# Patient Record
Sex: FEMALE | Race: WHITE | NOT HISPANIC OR LATINO | Employment: OTHER | URBAN - NONMETROPOLITAN AREA
[De-identification: names, ages, dates, MRNs, and addresses within clinical notes are randomized per-mention and may not be internally consistent; named-entity substitution may affect disease eponyms.]

---

## 2017-03-01 ENCOUNTER — OFFICE VISIT - GICH (OUTPATIENT)
Dept: FAMILY MEDICINE | Facility: OTHER | Age: 72
End: 2017-03-01

## 2017-03-01 ENCOUNTER — HISTORY (OUTPATIENT)
Dept: FAMILY MEDICINE | Facility: OTHER | Age: 72
End: 2017-03-01

## 2017-03-01 DIAGNOSIS — R79.89 OTHER SPECIFIED ABNORMAL FINDINGS OF BLOOD CHEMISTRY: ICD-10-CM

## 2017-03-01 DIAGNOSIS — N28.9 DISORDER OF KIDNEY AND URETER: ICD-10-CM

## 2017-03-01 DIAGNOSIS — R39.11 HESITANCY OF MICTURITION: ICD-10-CM

## 2017-03-01 DIAGNOSIS — R68.89 OTHER GENERAL SYMPTOMS AND SIGNS: ICD-10-CM

## 2017-03-01 LAB
ABSOLUTE BASOPHILS - HISTORICAL: 0.1 THOU/CU MM
ABSOLUTE EOSINOPHILS - HISTORICAL: 0.1 THOU/CU MM
ABSOLUTE LYMPHOCYTES - HISTORICAL: 1.5 THOU/CU MM (ref 0.9–2.9)
ABSOLUTE MONOCYTES - HISTORICAL: 0.7 THOU/CU MM
ABSOLUTE NEUTROPHILS - HISTORICAL: 7.1 THOU/CU MM (ref 1.7–7)
ANION GAP - HISTORICAL: 13 (ref 5–18)
BACTERIA URINE: NORMAL BACTERIA/HPF
BASOPHILS # BLD AUTO: 1 %
BILIRUB UR QL: NEGATIVE
BUN SERPL-MCNC: 36 MG/DL (ref 7–25)
BUN/CREAT RATIO - HISTORICAL: 24
CALCIUM SERPL-MCNC: 9.9 MG/DL (ref 8.6–10.3)
CHLORIDE SERPLBLD-SCNC: 100 MMOL/L (ref 98–107)
CLARITY, URINE: CLEAR CLARITY
CO2 SERPL-SCNC: 28 MMOL/L (ref 21–31)
COLOR UR: YELLOW COLOR
CREAT SERPL-MCNC: 1.52 MG/DL (ref 0.7–1.3)
EOSINOPHIL NFR BLD AUTO: 1.5 %
EPITHELIAL CELLS: NORMAL EPI/HPF
ERYTHROCYTE [DISTWIDTH] IN BLOOD BY AUTOMATED COUNT: 14.8 % (ref 11.5–15.5)
GFR IF NOT AFRICAN AMERICAN - HISTORICAL: 34 ML/MIN/1.73M2
GLUCOSE SERPL-MCNC: 142 MG/DL (ref 70–105)
GLUCOSE URINE: NEGATIVE MG/DL
HCT VFR BLD AUTO: 42.1 % (ref 33–51)
HEMOGLOBIN: 13.1 G/DL (ref 12–16)
KETONES UR QL: NEGATIVE MG/DL
LEUKOCYTE ESTERASE URINE: NEGATIVE
LYMPHOCYTES NFR BLD AUTO: 15.5 % (ref 20–44)
MCH RBC QN AUTO: 27.7 PG (ref 26–34)
MCHC RBC AUTO-ENTMCNC: 31.1 G/DL (ref 32–36)
MCV RBC AUTO: 89 FL (ref 80–100)
MONOCYTES NFR BLD AUTO: 7.3 %
NEUTROPHILS NFR BLD AUTO: 74.6 % (ref 42–72)
NITRITE UR QL STRIP: NEGATIVE
OCCULT BLOOD,URINE - HISTORICAL: ABNORMAL
PH UR: 6.5 [PH]
PLATELET # BLD AUTO: 299 THOU/CU MM (ref 140–440)
PMV BLD: 6.6 FL (ref 6.5–11)
POTASSIUM SERPL-SCNC: 4.2 MMOL/L (ref 3.5–5.1)
PROTEIN QUALITATIVE,URINE - HISTORICAL: 100 MG/DL
RBC - HISTORICAL: NORMAL /HPF
RED BLOOD COUNT - HISTORICAL: 4.73 MIL/CU MM (ref 4–5.2)
SODIUM SERPL-SCNC: 141 MMOL/L (ref 133–143)
SP GR UR STRIP: 1.02
UROBILINOGEN,QUALITATIVE - HISTORICAL: NORMAL EU/DL
WBC - HISTORICAL: NORMAL /HPF
WHITE BLOOD COUNT - HISTORICAL: 9.5 THOU/CU MM (ref 4.5–11)

## 2017-03-13 ENCOUNTER — TRANSFERRED RECORDS (OUTPATIENT)
Dept: HEALTH INFORMATION MANAGEMENT | Facility: CLINIC | Age: 72
End: 2017-03-13

## 2017-03-13 ENCOUNTER — AMBULATORY - GICH (OUTPATIENT)
Dept: FAMILY MEDICINE | Facility: OTHER | Age: 72
End: 2017-03-13

## 2017-03-13 ENCOUNTER — MEDICAL CORRESPONDENCE (OUTPATIENT)
Facility: CLINIC | Age: 72
End: 2017-03-13
Payer: COMMERCIAL

## 2017-03-13 ENCOUNTER — COMMUNICATION - GICH (OUTPATIENT)
Dept: FAMILY MEDICINE | Facility: OTHER | Age: 72
End: 2017-03-13

## 2017-03-13 ENCOUNTER — HOSPITAL ENCOUNTER (OUTPATIENT)
Dept: RADIOLOGY | Facility: OTHER | Age: 72
End: 2017-03-13
Attending: PHYSICIAN ASSISTANT

## 2017-03-13 DIAGNOSIS — R79.89 OTHER SPECIFIED ABNORMAL FINDINGS OF BLOOD CHEMISTRY: ICD-10-CM

## 2017-03-13 DIAGNOSIS — N28.9 DISORDER OF KIDNEY AND URETER: ICD-10-CM

## 2017-03-13 DIAGNOSIS — R06.02 SHORTNESS OF BREATH: ICD-10-CM

## 2017-03-13 DIAGNOSIS — R68.89 OTHER GENERAL SYMPTOMS AND SIGNS: ICD-10-CM

## 2017-03-13 PROCEDURE — 93306 TTE W/DOPPLER COMPLETE: CPT | Mod: 26 | Performed by: INTERNAL MEDICINE

## 2017-03-15 ENCOUNTER — COMMUNICATION - GICH (OUTPATIENT)
Dept: FAMILY MEDICINE | Facility: OTHER | Age: 72
End: 2017-03-15

## 2017-03-15 DIAGNOSIS — R79.89 OTHER SPECIFIED ABNORMAL FINDINGS OF BLOOD CHEMISTRY: ICD-10-CM

## 2017-03-15 DIAGNOSIS — R93.429 ABNORMAL FINDING ON DIAGNOSTIC IMAGING OF KIDNEY: ICD-10-CM

## 2017-03-15 DIAGNOSIS — I27.29 OTHER SECONDARY PULMONARY HYPERTENSION (H): ICD-10-CM

## 2017-03-21 ENCOUNTER — HISTORY (OUTPATIENT)
Dept: PEDIATRICS | Facility: OTHER | Age: 72
End: 2017-03-21

## 2017-03-21 ENCOUNTER — OFFICE VISIT - GICH (OUTPATIENT)
Dept: PEDIATRICS | Facility: OTHER | Age: 72
End: 2017-03-21

## 2017-03-21 DIAGNOSIS — N18.30 CHRONIC KIDNEY DISEASE, STAGE III (MODERATE) (H): ICD-10-CM

## 2017-03-21 DIAGNOSIS — R93.429 ABNORMAL FINDING ON DIAGNOSTIC IMAGING OF KIDNEY: ICD-10-CM

## 2017-03-21 DIAGNOSIS — E66.01 MORBID (SEVERE) OBESITY DUE TO EXCESS CALORIES (H): ICD-10-CM

## 2017-03-21 DIAGNOSIS — R79.89 OTHER SPECIFIED ABNORMAL FINDINGS OF BLOOD CHEMISTRY: ICD-10-CM

## 2017-03-21 DIAGNOSIS — E11.9 TYPE 2 DIABETES MELLITUS WITHOUT COMPLICATIONS (H): ICD-10-CM

## 2017-03-21 DIAGNOSIS — I27.29 OTHER SECONDARY PULMONARY HYPERTENSION (H): ICD-10-CM

## 2017-04-11 ENCOUNTER — COMMUNICATION - GICH (OUTPATIENT)
Dept: FAMILY MEDICINE | Facility: OTHER | Age: 72
End: 2017-04-11

## 2017-04-11 DIAGNOSIS — E78.5 HYPERLIPIDEMIA: ICD-10-CM

## 2017-04-11 DIAGNOSIS — E11.65 TYPE 2 DIABETES MELLITUS WITH HYPERGLYCEMIA (H): ICD-10-CM

## 2017-04-11 DIAGNOSIS — I10 ESSENTIAL (PRIMARY) HYPERTENSION: ICD-10-CM

## 2017-05-08 ENCOUNTER — HOSPITAL ENCOUNTER (OUTPATIENT)
Dept: RADIOLOGY | Facility: OTHER | Age: 72
End: 2017-05-08

## 2017-05-08 ENCOUNTER — HISTORY (OUTPATIENT)
Dept: FAMILY MEDICINE | Facility: OTHER | Age: 72
End: 2017-05-08

## 2017-05-08 ENCOUNTER — HISTORY (OUTPATIENT)
Dept: RADIOLOGY | Facility: OTHER | Age: 72
End: 2017-05-08

## 2017-05-08 ENCOUNTER — OFFICE VISIT - GICH (OUTPATIENT)
Dept: FAMILY MEDICINE | Facility: OTHER | Age: 72
End: 2017-05-08

## 2017-05-08 DIAGNOSIS — Z12.31 ENCOUNTER FOR SCREENING MAMMOGRAM FOR MALIGNANT NEOPLASM OF BREAST: ICD-10-CM

## 2017-05-08 DIAGNOSIS — N28.9 DISORDER OF KIDNEY AND URETER: ICD-10-CM

## 2017-05-08 DIAGNOSIS — R79.9 ABNORMAL FINDING OF BLOOD CHEMISTRY: ICD-10-CM

## 2017-05-08 DIAGNOSIS — J45.40 MODERATE PERSISTENT ASTHMA, UNCOMPLICATED: ICD-10-CM

## 2017-05-08 DIAGNOSIS — I10 ESSENTIAL (PRIMARY) HYPERTENSION: ICD-10-CM

## 2017-05-08 DIAGNOSIS — I27.29 OTHER SECONDARY PULMONARY HYPERTENSION (H): ICD-10-CM

## 2017-05-08 DIAGNOSIS — E11.65 TYPE 2 DIABETES MELLITUS WITH HYPERGLYCEMIA (H): ICD-10-CM

## 2017-05-08 DIAGNOSIS — K43.2 INCISIONAL HERNIA, WITHOUT OBSTRUCTION OR GANGRENE: ICD-10-CM

## 2017-05-08 DIAGNOSIS — E78.5 HYPERLIPIDEMIA: ICD-10-CM

## 2017-05-08 LAB
A/G RATIO - HISTORICAL: 1.3 (ref 1–2)
ALB RAND URINE - HISTORICAL: 1356.1 MG/L
ALBUMIN SERPL-MCNC: 3.9 G/DL (ref 3.5–5.7)
ALP SERPL-CCNC: 66 IU/L (ref 34–104)
ALT (SGPT) - HISTORICAL: 17 IU/L (ref 7–52)
ANION GAP - HISTORICAL: 11 (ref 5–18)
AST SERPL-CCNC: 16 IU/L (ref 13–39)
BACTERIA URINE: NORMAL BACTERIA/HPF
BILIRUB SERPL-MCNC: 0.5 MG/DL (ref 0.3–1)
BILIRUB UR QL: NEGATIVE
BUN SERPL-MCNC: 22 MG/DL (ref 7–25)
BUN/CREAT RATIO - HISTORICAL: 17
CALCIUM SERPL-MCNC: 9.6 MG/DL (ref 8.6–10.3)
CHLORIDE SERPLBLD-SCNC: 103 MMOL/L (ref 98–107)
CHOL/HDL RATIO - HISTORICAL: 2.38
CHOLESTEROL TOTAL: 119 MG/DL
CLARITY, URINE: CLEAR CLARITY
CO2 SERPL-SCNC: 24 MMOL/L (ref 21–31)
COLOR UR: YELLOW COLOR
CREAT SERPL-MCNC: 1.29 MG/DL (ref 0.7–1.3)
CREATININE, URINE - HISTORICAL: 0.62 G/L
EPITHELIAL CELLS: NORMAL EPI/HPF
ESTIMATED AVERAGE GLUCOSE: 140 MG/DL
GFR IF NOT AFRICAN AMERICAN - HISTORICAL: 41 ML/MIN/1.73M2
GLOBULIN - HISTORICAL: 3.1 G/DL (ref 2–3.7)
GLUCOSE SERPL-MCNC: 131 MG/DL (ref 70–105)
GLUCOSE URINE: NEGATIVE MG/DL
HDLC SERPL-MCNC: 50 MG/DL (ref 23–92)
HEMOGLOBIN A1C MONITORING (POCT) - HISTORICAL: 6.5 % (ref 4–6.2)
HEMOGLOBIN: 12.3 G/DL (ref 12–16)
KETONES UR QL: NEGATIVE MG/DL
LDLC SERPL CALC-MCNC: 46 MG/DL
LEUKOCYTE ESTERASE URINE: NEGATIVE
MCV RBC AUTO: 90 FL (ref 80–100)
MICROALBUMIN, RAND UR - HISTORICAL: 2187.3 MG/G CREAT
NITRITE UR QL STRIP: NEGATIVE
NON-HDL CHOLESTEROL - HISTORICAL: 69 MG/DL
OCCULT BLOOD,URINE - HISTORICAL: ABNORMAL
PATIENT STATUS - HISTORICAL: NORMAL
PH UR: 6 [PH]
POTASSIUM SERPL-SCNC: 4.7 MMOL/L (ref 3.5–5.1)
PROT SERPL-MCNC: 7 G/DL (ref 6.4–8.9)
PROTEIN QUALITATIVE,URINE - HISTORICAL: 100 MG/DL
RBC - HISTORICAL: NORMAL /HPF
SODIUM SERPL-SCNC: 138 MMOL/L (ref 133–143)
SP GR UR STRIP: 1.02
TRIGL SERPL-MCNC: 117 MG/DL
URATE SERPL-MCNC: 5.3 MG/DL (ref 4.4–7.6)
UROBILINOGEN,QUALITATIVE - HISTORICAL: NORMAL EU/DL
WBC - HISTORICAL: NORMAL /HPF

## 2017-06-06 ENCOUNTER — COMMUNICATION - GICH (OUTPATIENT)
Dept: FAMILY MEDICINE | Facility: OTHER | Age: 72
End: 2017-06-06

## 2017-06-06 ENCOUNTER — AMBULATORY - GICH (OUTPATIENT)
Dept: RADIOLOGY | Facility: OTHER | Age: 72
End: 2017-06-06

## 2017-06-06 DIAGNOSIS — R00.1 BRADYCARDIA: ICD-10-CM

## 2017-06-06 DIAGNOSIS — I27.29 OTHER SECONDARY PULMONARY HYPERTENSION (H): ICD-10-CM

## 2017-06-07 ENCOUNTER — AMBULATORY - GICH (OUTPATIENT)
Dept: SCHEDULING | Facility: OTHER | Age: 72
End: 2017-06-07

## 2017-06-15 ENCOUNTER — HISTORY (OUTPATIENT)
Dept: FAMILY MEDICINE | Facility: OTHER | Age: 72
End: 2017-06-15

## 2017-06-15 ENCOUNTER — OFFICE VISIT - GICH (OUTPATIENT)
Dept: FAMILY MEDICINE | Facility: OTHER | Age: 72
End: 2017-06-15

## 2017-06-15 DIAGNOSIS — I44.7 LEFT BUNDLE-BRANCH BLOCK: ICD-10-CM

## 2017-06-15 DIAGNOSIS — R00.1 BRADYCARDIA: ICD-10-CM

## 2017-06-15 DIAGNOSIS — I10 ESSENTIAL (PRIMARY) HYPERTENSION: ICD-10-CM

## 2017-06-16 ENCOUNTER — AMBULATORY - GICH (OUTPATIENT)
Dept: SCHEDULING | Facility: OTHER | Age: 72
End: 2017-06-16

## 2017-06-20 ENCOUNTER — AMBULATORY - GICH (OUTPATIENT)
Dept: SCHEDULING | Facility: OTHER | Age: 72
End: 2017-06-20

## 2017-08-01 ENCOUNTER — COMMUNICATION - GICH (OUTPATIENT)
Dept: FAMILY MEDICINE | Facility: OTHER | Age: 72
End: 2017-08-01

## 2017-08-01 DIAGNOSIS — R79.9 ABNORMAL FINDING OF BLOOD CHEMISTRY: ICD-10-CM

## 2017-08-01 DIAGNOSIS — I10 ESSENTIAL (PRIMARY) HYPERTENSION: ICD-10-CM

## 2017-09-20 ENCOUNTER — COMMUNICATION - GICH (OUTPATIENT)
Dept: FAMILY MEDICINE | Facility: OTHER | Age: 72
End: 2017-09-20

## 2017-09-20 DIAGNOSIS — E11.65 TYPE 2 DIABETES MELLITUS WITH HYPERGLYCEMIA (H): ICD-10-CM

## 2017-12-28 NOTE — TELEPHONE ENCOUNTER
Patient Information     Patient Name MRN Sex Milena Guerin [id number] Female 1945      Telephone Encounter by Madeline Ruano RN at 2017  8:29 AM     Author:  Madeline Ruano RN Service:  (none) Author Type:  NURS- Registered Nurse     Filed:  2017  8:46 AM Encounter Date:  2017 Status:  Signed     :  Madeline Ruano RN (NURS- Registered Nurse)            Contacted patient who verified that she is taking Metformin 1,000 mg one tablet daily. Please note updated sig to reflect what patient is taking   This is a Refill request from: [facility]  Name of Medication:metFORMIN (GLUCOPHAGE) 1,000 mg tablet  Quantity requested: 90 x 3   Last fill date:   Due for refill: yes   Last visit with ALEXEY HAWKINS was on: 2017- refer to  17 letter recommending to to take half of the old dose of Metformin. Also recommended to repeat labs at the end of summer. Reminder letter sent.   Diagnosis r/t this medication request: DM       HEMOGLOBIN A1C MONITORING (POCT)    Date Value   2017 6.5 % (H)   2013 9.0 % NGSP (H)

## 2017-12-28 NOTE — TELEPHONE ENCOUNTER
Patient Information     Patient Name MRN Sex Milena Guerin [id number] Female 1945      Telephone Encounter by Madeline Ruano RN at 2017 11:48 AM     Author:  Madeline Ruano RN Service:  (none) Author Type:  NURS- Registered Nurse     Filed:  2017 12:07 PM Encounter Date:  2017 Status:  Signed     :  Madeline Ruano RN (NURS- Registered Nurse)            These medications are listed as historical and without sig on snapshot. Please note medications arthur'd up as requested by pharmacy. Please review, sign and send if agree.     This is a Refill request from: [facility]  Name of Medication: carvedilol (COREG) 6.25 mg tablet-take 1 tab orally twice daily with food   Quantity requested: 180 x 3   Last fill date: 17 per pharmacy   Diagnosis r/t this medication request: unsure     Name of Medication: allopurinol (ZYLOPRIM) 100 mg tablet-take 1 tab orally once daily  Quantity requested: 90 x 3   Last fill date: 17 per pharmacy  Diagnosis r/t this medication request: Hyperuricemia    Last visit with ALEXEY HAWKINS was on: 2017 in [facility]       Unable to complete prescription refill per RN Medication Refill Policy.................... Madeline Ruano RN ....................  2017   11:59 AM

## 2017-12-28 NOTE — PROGRESS NOTES
Patient Information     Patient Name MRN Sex Milena Guerin [id number] Female 1945      Progress Notes by Ezequiel Aponte MD at 6/15/2017  2:00 PM     Author:  Ezequiel Aponte MD Service:  (none) Author Type:  Physician     Filed:  6/15/2017  2:28 PM Encounter Date:  6/15/2017 Status:  Signed     :  Ezequiel Aponte MD (Physician)            SUBJECTIVE:  Milena Contreras is a 71 y.o. female here for ER follow-up. Patient was recently seen in the [facility] emergency room that she's having shortness of breath. She was found to have some to Medicare card. She was preceded on atenolol 100 mg twice daily and that was discontinued. She was switched to carvedilol 6.25 mg twice daily. She reports that her swelling, shortness of breath and exertional activity has improved significantly. Overall she is quite happy with her improvement. She has been tolerating carvedilol well.    While in the emergency room she was found to have left bundle branch block, she is unsure if this is new.    She has a history of recent echocardiogram in March which showed normal ejection fraction and mild pulmonary hypertension.      Patient Active Problem List       Diagnosis  Date Noted     LBBB (left bundle branch block)  06/15/2017     Mild pulmonary hypertension (HC)  2017     Bilateral primary osteoarthritis of knee  2016     ABDOMINAL INCISIONAL HERNIA  2012     RENAL INSUFFICIENCY  2010     CKD-3          DIABETES MELLITUS, TYPE II, UNCONTROLLED  2010     HYPERTENSION       ASTHMA, PERSISTENT, MODERATE       cold, deer fur and cat triggered, spirometry done 05          PSORIASIS       OBESITY       HYPERLIPIDEMIA       HYPERURICEMIA         Past Medical History:     Diagnosis  Date     Alopecia      Chronic kidney disease     Cr 1.3; +proteinuria      DM2 (diabetes mellitus, type 2) (HC)     DM2 - referred to [facility]       Hyperlipidemia      Hyperuricemia     With gout x3       Obesity      Persistent asthma        Past Surgical History:      Procedure  Laterality Date     COLONOSCOPY SCREENING  01/2004    a few external hemorrhoids       COLONOSCOPY SCREENING  4/3/2014    diverticulosis fu 10 yrs       FRACTURE TREATMENT      Left finger ORIF for fracture       JIMMY AND BSO      JIMMY/BSO for uterine fibroids          Current Outpatient Prescriptions       Medication  Sig Dispense Refill     albuterol HFA (VENTOLIN HFA) 90 mcg/actuation inhaler Inhale 2 Puffs by mouth every 4 hours if needed for Shortness Of Breath or Wheezing. 1 Inhaler 11     allopurinol (ZYLOPRIM) 100 mg tablet        amLODIPine (NORVASC) 10 mg tablet Take 1 tablet by mouth once daily. 90 tablet 3     aspirin chewable 81 mg chewable tablet Take 81 mg by mouth once daily with a meal.       blood sugar diagnostic (ASCENSIA CONTOUR) strip Testing twice daily E11.65 100 Strip 10     CALCIUM CARBONATE/VITAMIN D3 (CALCIUM WITH VITAMIN D ORAL) Take 1 Tab by mouth 2 times daily.       carvedilol (COREG) 6.25 mg tablet        fluticasone-salmeterol (ADVAIR DISKUS) 250-50 mcg/Dose diskus inhaler Inhale 1 Puff by mouth 2 times daily. 3 Inhaler 3     lisinopril-hydrochlorothiazide, 20-25 mg, (PRINZIDE, ZESTORETIC) 20-25 mg per tablet Take 1 tablet by mouth once daily. 90 tablet 3     metFORMIN (GLUCOPHAGE) 1,000 mg tablet        omega-3 fatty acids-vitamin E (FISH OIL) 1,000 mg cap Take 1 capsule by mouth once daily.       simvastatin (ZOCOR) 20 mg tablet Take 1 tablet by mouth at bedtime. 90 tablet 3     No current facility-administered medications for this visit.      Medications have been reviewed by me and are current to the best of my knowledge and ability.      Allergies:  Allergies      Allergen   Reactions     Cats (Fur, Dander, Saliva)  Rash     And BEER        Family History       Problem   Relation Age of Onset     Cancer  Mother      Lung        Hypertension  Mother      Cancer-breast  Mother      Age 80       Stroke   Mother       of CVA age 79       Heart Disease  Father       of MI age 72       Asthma  Daughter      Hypertension  Sister      Hypertension       Osteoarthritis  Sister      Osteoarthritis  Brother      Kidney disease  No Family History      Blood Disease  No Family History      no blood clots         Social History     Substance Use Topics       Smoking status: Never Smoker     Smokeless tobacco: Never Used     Alcohol use No       ROS:    As above otherwise ROS is unremarkable.      OBJECTIVE:  /82  Pulse 68  Wt 106.6 kg (235 lb)  BMI 40.34 kg/m2    EXAM:  General Appearance: Pleasant, alert, appropriate appearance for age. No acute distress  Lungs: Normal chest wall and respirations. Clear to auscultation, no wheezes or crackles.  Cardiovascular: Regular rate and rhythm. S1, S2, no murmurs.  Musculoskeletal: 1+ pedal edema onto her knees bilaterally.    ASSESSEMENT AND PLAN:    Milena was seen today for follow up.    Diagnoses and all orders for this visit:    LBBB (left bundle branch block)    Bradycardia    HYPERTENSION    Labs and discharge paperwork reviewed from [place]. Her blood pressure at home is now in the 120s to 140s over 60s to 70s. Her heart rate is also in the 60s and 70s. She is feeling significantly improved. Recommend that she continue with her current regimen. She'll follow-up with her primary physician as scheduled for diabetes and high blood pressure follow-up. She'll continue monitoring her blood pressure and if it elevates or if her heart rate is abnormal she will follow-up for reassessment.    25 minutes were spent with the patient, greater than 50% was in coordination of further care and counseling of the above medical problems.        Darryn Aponte MD

## 2017-12-30 NOTE — NURSING NOTE
Patient Information     Patient Name MRN Sex Milena Guerin [id number] Female 1945      Nursing Note by Katie Roque at 6/15/2017  2:00 PM     Author:  Katie Roque Service:  (none) Author Type:  (none)     Filed:  6/15/2017  2:14 PM Encounter Date:  6/15/2017 Status:  Signed     :  Katie Roque            Patient presents today to follow up from her ED visit in [place].  Katie Roque LPN .............6/15/2017  1:54 PM

## 2018-01-03 NOTE — TELEPHONE ENCOUNTER
Patient Information     Patient Name MRN Milena Oliver [id number] Female 1945      Telephone Encounter by Ara Edgar at 3/15/2017  1:37 PM     Author:  Ara Edgar Service:  (none) Author Type:  (none)     Filed:  3/15/2017  1:38 PM Encounter Date:  3/15/2017 Status:  Signed     :  Ara Edgar            Patient states that she has already seen a specialist for her kidneys.  She states that she has a physical coming up with An De La Rosa MD.  Patient is asking if this is something urgent or if she can just wait and see what An De La Rosa MD thinks about all of this.    Ara Edgar LPN........................3/15/2017  1:38 PM

## 2018-01-03 NOTE — TELEPHONE ENCOUNTER
Patient Information     Patient Name MRN Sex Mielna Guerin [id number] Female 1945      Telephone Encounter by Hetal Gonzalez PA-C at 3/15/2017  2:13 PM     Author:  Hetal Gonzalez PA-C Service:  (none) Author Type:  PHYS- Physician Assistant     Filed:  3/15/2017  2:14 PM Encounter Date:  3/15/2017 Status:  Signed     :  Hetal Gonzalez PA-C (PHYS- Physician Assistant)            I consulted with Dr. De La Rosa and she would like to have this looked at sooner than the appointment with her. You can see your kidney specialist again for the kidney concerns; however I would also recommend seeing Internal Medicine to discuss the heart findings.   Hetal Gonzalez PA-C ....................  3/15/2017   2:14 PM

## 2018-01-03 NOTE — TELEPHONE ENCOUNTER
Patient Information     Patient Name MRN Sex Milena Guerin [id number] Female 1945      Telephone Encounter by Hetal Gonzalez PA-C at 3/15/2017 12:31 PM     Author:  Hetal Gonzalez PA-C Service:  (none) Author Type:  PHYS- Physician Assistant     Filed:  3/15/2017 12:32 PM Encounter Date:  3/15/2017 Status:  Signed     :  Hetal Gonzalez PA-C (PHYS- Physician Assistant)            Please call  your kidney ultrasound showed changes which can represent kidney disease.  Your echo also showed signs of mild pulmonary hypertension. With the symptoms that you're having along with elevated kidney function I would recommend meeting with internal medicine to discuss the results and your symptoms. Please schedule the patient care coordinator.  Hetal Gonzalez PA-C ....................  3/15/2017   12:32 PM

## 2018-01-03 NOTE — NURSING NOTE
Patient Information     Patient Name MRN Sex Milena Guerin [id number] Female 1945      Nursing Note by Ritu Chavez at 3/21/2017  3:00 PM     Author:  Ritu Chavez Service:  (none) Author Type:  (none)     Filed:  3/21/2017  4:27 PM Encounter Date:  3/21/2017 Status:  Signed     :  Ritu Chavez            Patient presents to clinic for consult.  Ritu Chavez ....................  3/21/2017   3:43 PM

## 2018-01-03 NOTE — TELEPHONE ENCOUNTER
Patient Information     Patient Name MRN Milena Oliver [id number] Female 1945      Telephone Encounter by Ara Edgar at 3/15/2017  3:11 PM     Author:  Ara Edgar Service:  (none) Author Type:  (none)     Filed:  3/15/2017  3:11 PM Encounter Date:  3/15/2017 Status:  Signed     :  Ara Edgar            Patient notified.  She will schedule internal medicine consult.  Ara Edgar LPN........................3/15/2017  3:11 PM

## 2018-01-03 NOTE — TELEPHONE ENCOUNTER
Patient Information     Patient Name MRN Sex Milena Guerin [id number] Female 1945      Telephone Encounter by Courtney Berkowitz RN at 3/13/2017 10:53 AM     Author:  Courtney Berkowitz RN Service:  (none) Author Type:  (none)     Filed:  3/13/2017 10:55 AM Encounter Date:  3/13/2017 Status:  Signed     :  Courtney Berkowitz RN (NURS- Registered Nurse)            Patient was ordered an ECHO by Hetal Gonzalez. Echo technician is unable to determine what diagnosis to use when completing order. Unable to be found by this nurse. Echo tech states she will walk to [address] to find Hetal Gonzalez as she could not be reached by phone.  Courtney Berkowitz RN............. 3/13/2017 10:55 AM

## 2018-01-03 NOTE — NURSING NOTE
Patient Information     Patient Name MRN Milena Oliver [id number] Female 1945      Nursing Note by Ara Edgar at 3/1/2017 11:00 AM     Author:  Ara Edgar Service:  (none) Author Type:  (none)     Filed:  3/1/2017 11:22 AM Encounter Date:  3/1/2017 Status:  Signed     :  Ara Edgar            Patient presents to the clinic for decreased urinary output.   Ara Edgar LPN........................3/1/2017  11:06 AM

## 2018-01-04 NOTE — PATIENT INSTRUCTIONS
Patient Information     Patient Name MRN Sex Milena Guerin [id number] Female 1945      Patient Instructions by Tk Hernandez MD at 3/21/2017  3:00 PM     Author:  Tk Hernandez MD Service:  (none) Author Type:  Physician     Filed:  3/21/2017  4:39 PM Encounter Date:  3/21/2017 Status:  Signed     :  Tk Hernandez MD (Physician)             -- Agree with stopping metformin   -- Work on 5% weight loss   -- Follow-up with Dr. An De La Rosa MD as planned in May   -- Consider repeat echo if short of breath with exertion seems to worsen       Index Related topics   Pulmonary Hypertension   ________________________________________________________________________  KEY POINTS    Pulmonary hypertension (PH) is high blood pressure in your lungs. You start having symptoms because not enough blood is being pumped through the lungs to  oxygen for your body to use.    There is no cure for PHT, but treatment to improve your symptoms may include medicine and oxygen. You may be able to have a heart-lung transplant.    Carefully follow your provider's instructions for taking medicines and for physical activity. Try not to get sick with a cold or the flu.  ________________________________________________________________________  What is pulmonary hypertension?  Pulmonary hypertension (PH) is high blood pressure in your lungs. The blood vessels in your lungs get thicker and stiffer, which makes your heart work harder to pump blood through your lungs. This raises the blood pressure in these blood vessels. The heart muscle gets thicker and stronger. As the disease gets worse, the right ventricle can't get any stronger and you start having symptoms because not enough blood is being pumped through the lungs to  oxygen for your body to use.  What is the cause?  The cause of PH is not always known, but it can happen because of another disease. Possible causes include:    Heart  problems that you are born with    Blood clots in your lung    Lung diseases such as COPD or pulmonary fibrosis, which causes scar tissue in your lungs    Heart failure or heart valve problems    Sleep apnea, which is when you stop breathing for more than 10 seconds at a time many times during your sleep    Some drugs, such as diet drugs    Diseases that attack your immune system, such as HIV infection or rheumatoid arthritis that may damage the lungs  It is more likely to affect young and middle-aged women. It seems to run in families.  What are the symptoms?  The first symptoms are usually shortness of breath, fast heart rate, or feeling lightheaded with activity. Over time, you may have symptoms with only mild activity or while resting, such as:    Chest pain    Dizziness or fainting    Feeling tired or weak    Swelling in your legs  How is it diagnosed?  Your healthcare provider will ask about your symptoms and medical history and examine you. Tests may include:    Blood tests    Chest X-ray    CT scan, which uses X-rays and a computer to show detailed pictures of the chest    An ECG (also called an EKG or electrocardiogram), which measures and records your heartbeat.    Lung scan, which uses a small amount of radioactive material injected into your blood or inhaled to make detailed pictures of your lungs    Pulmonary angiogram, which is a series of X-rays taken after your healthcare provider places a long, thin, flexible tube (catheter) into a blood vessel in your groin and up to your heart and injects a special dye into your blood vessels to check how the blood is flowing through the arteries in your heart and lungs    An echocardiogram, which uses sound waves (ultrasound) to see how well your heart is pumping    Sometimes heart catheterization is needed to measure the pressure in the blood vessels in the lungs.  How is it treated?  There is no cure for PHT, but treatment can improve your symptoms. Treatment  may include medicine to:    Expand blood vessels in the lungs and improve blood flow    Lower blood pressure    Prevent blood clots that might further block blood flow through the lungs    Help your heart pump  You may need oxygen therapy to help you breathe better.  A heart-lung transplant may be a possibility for some people.  How can I take care of myself?  Follow the full course of treatment prescribed by your healthcare provider. In addition:    Carefully follow your provider's instructions for taking medicines.    Do not smoke.    Follow your provider's recommendations for physical activity. Exercise helps strengthen your heart and body and improves your blood flow and energy level. Avoid outdoor exercise if it is very hot, cold, or humid. Getting very hot or cold may cause your heart to work harder. Balance exercise with rest.    Get enough rest, shorten your working hours if possible, and try to reduce the stress in your life. Anxiety and anger can increase your heart rate and blood pressure. If you need help with this, ask your healthcare provider.    Ask your provider if you should avoid drinking alcohol. Alcohol can weaken your heart or may worsen heart failure. It may also interfere with medicines you are taking.    Try not to get sick with a cold or the flu, which can be very serious if you have pulmonary hypertension. Stay away from people who are sick and get a flu shot every year. Ask your healthcare provider if you need a pneumococcal shot.    Women with PH should avoid getting pregnant.  Ask your provider:    How and when you will get your test results    If there are activities you should avoid and when you can return to your normal activities    How to take care of yourself at home    What symptoms or problems you should watch for and what to do if you have them  Make sure you know when you should come back for a checkup. Keep all appointments for provider visits or tests.  Developed by  [employer].  Adult Advisor 2016.3 published by [employer].  Last modified: 2016-06-13  Last reviewed: 2014-12-08  This content is reviewed periodically and is subject to change as new health information becomes available. The information is intended to inform and educate and is not a replacement for medical evaluation, advice, diagnosis or treatment by a healthcare professional.  References   Adult Advisor 2016.3 Index    Copyright   2016 [employer], a division of [employer] All rights reserved.

## 2018-01-04 NOTE — PROGRESS NOTES
Patient Information     Patient Name MRN Sex Milena Guerin [id number] Female 1945      Progress Notes by Tk Hernandez MD at 3/21/2017  3:00 PM     Author:  Tk Hernandez MD Service:  (none) Author Type:  Physician     Filed:  3/21/2017  5:05 PM Encounter Date:  3/21/2017 Status:  Signed     :  Tk Hernandez MD (Physician)            Internal Medicine/Pediatrics Consult  DOS: 3/21/2017    CC:   Chief Complaint     Patient presents with       Consult      pulmonary HTN       HPI:  Milena Contreras is a 71 y.o. female with a history of diabetes mellitus type 2 who was referred by Ms. Hetal Gonzalez for evaluation of pulmonary hypertension. She was urinating more frequently. She was worried about her kidney problem which has been ongoing for the last 4-5 years. She's had less urine production and more frequent urination. She wears a pad. She came in for an evaluation. Due to decline in kidney functioning echocardiogram was obtained which revealed pulmonary hypertension and internal medicine consultation was requested. She does get a little bit of shortness of breath with exertion, which she does very little of. She'll go shopping at the store but uses a shopping cart. She is typically limited by pain in the knees. Her blood glucoses have been well controlled, most always under 150 both before breakfast and 2 hours after supper. When she knew that her kidney function was worsening she stopped taking her metformin on her own because she knows it can be bad with kidney problems. She's not taking any other diabetes medication. She does not snore. She doesn't stop breathing at night. She's never had a heart problem including heart attacks or stents. She thought this was something that shouldn't wait until she is scheduled to see Dr. Tanya De La Rosa in May 2017.    Review of Systems:  Constitutional: Normal  Eyes: Normal  Ears/nose/mouth/throat: Normal  Cardiology/vascular:  Normal  Respiratory: See history of present illness  Psychiatric: Normal  GI: Normal  : See history of present illness  Musculoskeletal: Normal  Neurological: Normal  Endocrine: See history of present illness  Hematological/lymphatic: Normal  Integumentary: Normal  Allergy/immunizations: Normal    Past Medical Hx:  Past Medical History      Diagnosis   Date     Alopecia       Chronic kidney disease  2014     Cr 1.3; +proteinuria      DM2 (diabetes mellitus, type 2) (HC)  2012     DM2 - referred to [facility]       Hyperlipidemia       Hyperuricemia       With gout x3      Obesity       Persistent asthma         Past Surgical Hx:  Past Surgical History       Procedure   Laterality Date     Fracture treatment        Left finger ORIF for fracture       Jimmy and bso        JIMMY/BSO for uterine fibroids        Colonoscopy screening   01/2004     a few external hemorrhoids       Colonoscopy screening   4/3/2014     diverticulosis fu 10 yrs         Allergies:  Allergies      Allergen   Reactions     Cats (Fur, Dander, Saliva)  Rash     And BEER        Medications:  Current Outpatient Prescriptions on File Prior to Visit       Medication  Sig Dispense Refill     albuterol HFA (VENTOLIN HFA) 90 mcg/actuation inhaler Inhale 2 Puffs by mouth every 4 hours if needed for Shortness Of Breath or Wheezing. 1 Inhaler 11     allopurinol (ZYLOPRIM) 300 mg tablet Take 1 tablet by mouth once daily. 90 tablet 3     amLODIPine (NORVASC) 10 mg tablet Take 1 tablet by mouth once daily. 90 tablet 3     aspirin chewable 81 mg chewable tablet Take 81 mg by mouth once daily with a meal.       atenolol (TENORMIN) 100 mg tablet Take 1 tablet by mouth 2 times daily. 180 tablet 3     blood sugar diagnostic (ASCENSIA CONTOUR) strip Testing twice daily E11.65 100 Strip 10     CALCIUM CARBONATE/VITAMIN D3 (CALCIUM WITH VITAMIN D ORAL) Take 1 Tab by mouth 2 times daily.       fluticasone-salmeterol (ADVAIR DISKUS) 250-50 mcg/Dose diskus inhaler Inhale 1 Puff  by mouth 2 times daily. 3 Inhaler 2     lisinopril-hydrochlorothiazide, 20-25 mg, (PRINZIDE, ZESTORETIC) 20-25 mg per tablet Take 1 tablet by mouth once daily. 90 tablet 3     omega-3 fatty acids-vitamin E (FISH OIL) 1,000 mg cap Take 1 capsule by mouth once daily.       simvastatin (ZOCOR) 20 mg tablet Take 1 tablet by mouth at bedtime. 90 tablet 3     No current facility-administered medications on file prior to visit.        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   previous had his own [employer] business.  This is her 3rd marriage.  has current disabilities from falling off a chair 11 years ago w/spinal cord bruise.                Family Hx:  Family History       Problem   Relation Age of Onset     Cancer  Mother      Lung        Hypertension  Mother      Cancer-breast  Mother      Age 80       Stroke  Mother       of CVA age 79       Heart Disease  Father       of MI age 72       Asthma  Daughter      Hypertension  Sister      Hypertension       Kidney disease  No Family History      Blood Disease  No Family History      no blood clots         Objective:  Vitals: reviewed in EMR.    Visit Vitals       /74     Pulse 68     Resp 20     Wt 106.7 kg (235 lb 3.2 oz)     BMI 41.66 kg/m2       Gen: Pleasant female, NAD.  HEENT: MMM, no OP erythema.   Neck: Supple  CV: RRR no m/r/g.   Pulm: CTAB no w/r/r  Neuro: Grossly intact  Msk: No lower extremity edema.  Skin: No concerning lesions.  Psychiatric: Normal affect and insight. Does not appear anxious or depressed.    Labs:  Diabetes Labs  Lab Results      Component  Value Date/Time    HGBA1C 6.0 2016 12:20 PM    HGBA1C 9.0 (H) 2013 12:30 PM    HGBA1C 6.3 (H) 2012 10:29 AM    CHOL 145  05/05/2016 12:20 PM    HDL 53 05/05/2016 12:20 PM    LDLCHOL 62 05/05/2016 12:20 PM    TRIGLYCERIDE 148 05/05/2016 12:20 PM    MICROALBRAND 1113.5 (H) 05/05/2016 12:30 PM    CREATININE 1.52 (H) 03/01/2017 11:43 AM         Imaging:  Echocardiogram dated March 13, 2017 was personally reviewed with the patient today, revealing normal systolic function and mild pulmonary hypertension.    Assessment:    ICD-10-CM    1. Mild pulmonary hypertension (HC) I27.2    2. Pulmonary hypertension (HC) I27.2 AMB CONSULT TO INTERNAL MEDICINE   3. Abnormal renal ultrasound R93.429 AMB CONSULT TO INTERNAL MEDICINE   4. Elevated serum creatinine R79.89 AMB CONSULT TO INTERNAL MEDICINE   5. Morbid obesity due to excess calories (HC) E66.01    6. Controlled type 2 diabetes mellitus without complication, without long-term current use of insulin (HC) E11.9    7. Chronic kidney disease, stage III (moderate) N18.3        We had a lengthy discussion today with regards to pulmonary hypertension including potential risk factors for, potential symptoms from, expected clinical course. I believe that her mild pulmonary hypertension is most likely secondary to obesity. She does not have a history consistent with sleep apnea. No history of tobacco use which would lead toward COPD. Warning signs were discussed and if shortness of breath with exertion is progressing would obtain a repeat echocardiogram and consider pulmonary consultation.    Recommendations:  Patient Instructions    -- Agree with stopping metformin   -- Work on 5% weight loss   -- Follow-up with Dr. An De La Rosa MD as planned in May   -- Consider repeat echo if short of breath with exertion seems to worsen       Index Related topics   Pulmonary Hypertension   ________________________________________________________________________  KEY POINTS    Pulmonary hypertension (PH) is high blood pressure in your lungs. You start having symptoms because not enough blood is being  pumped through the lungs to  oxygen for your body to use.    There is no cure for PHT, but treatment to improve your symptoms may include medicine and oxygen. You may be able to have a heart-lung transplant.    Carefully follow your provider's instructions for taking medicines and for physical activity. Try not to get sick with a cold or the flu.  ________________________________________________________________________  What is pulmonary hypertension?  Pulmonary hypertension (PH) is high blood pressure in your lungs. The blood vessels in your lungs get thicker and stiffer, which makes your heart work harder to pump blood through your lungs. This raises the blood pressure in these blood vessels. The heart muscle gets thicker and stronger. As the disease gets worse, the right ventricle can't get any stronger and you start having symptoms because not enough blood is being pumped through the lungs to  oxygen for your body to use.  What is the cause?  The cause of PH is not always known, but it can happen because of another disease. Possible causes include:    Heart problems that you are born with    Blood clots in your lung    Lung diseases such as COPD or pulmonary fibrosis, which causes scar tissue in your lungs    Heart failure or heart valve problems    Sleep apnea, which is when you stop breathing for more than 10 seconds at a time many times during your sleep    Some drugs, such as diet drugs    Diseases that attack your immune system, such as HIV infection or rheumatoid arthritis that may damage the lungs  It is more likely to affect young and middle-aged women. It seems to run in families.  What are the symptoms?  The first symptoms are usually shortness of breath, fast heart rate, or feeling lightheaded with activity. Over time, you may have symptoms with only mild activity or while resting, such as:    Chest pain    Dizziness or fainting    Feeling tired or weak    Swelling in your legs  How is  it diagnosed?  Your healthcare provider will ask about your symptoms and medical history and examine you. Tests may include:    Blood tests    Chest X-ray    CT scan, which uses X-rays and a computer to show detailed pictures of the chest    An ECG (also called an EKG or electrocardiogram), which measures and records your heartbeat.    Lung scan, which uses a small amount of radioactive material injected into your blood or inhaled to make detailed pictures of your lungs    Pulmonary angiogram, which is a series of X-rays taken after your healthcare provider places a long, thin, flexible tube (catheter) into a blood vessel in your groin and up to your heart and injects a special dye into your blood vessels to check how the blood is flowing through the arteries in your heart and lungs    An echocardiogram, which uses sound waves (ultrasound) to see how well your heart is pumping    Sometimes heart catheterization is needed to measure the pressure in the blood vessels in the lungs.  How is it treated?  There is no cure for PHT, but treatment can improve your symptoms. Treatment may include medicine to:    Expand blood vessels in the lungs and improve blood flow    Lower blood pressure    Prevent blood clots that might further block blood flow through the lungs    Help your heart pump  You may need oxygen therapy to help you breathe better.  A heart-lung transplant may be a possibility for some people.  How can I take care of myself?  Follow the full course of treatment prescribed by your healthcare provider. In addition:    Carefully follow your provider's instructions for taking medicines.    Do not smoke.    Follow your provider's recommendations for physical activity. Exercise helps strengthen your heart and body and improves your blood flow and energy level. Avoid outdoor exercise if it is very hot, cold, or humid. Getting very hot or cold may cause your heart to work harder. Balance exercise with rest.    Get  enough rest, shorten your working hours if possible, and try to reduce the stress in your life. Anxiety and anger can increase your heart rate and blood pressure. If you need help with this, ask your healthcare provider.    Ask your provider if you should avoid drinking alcohol. Alcohol can weaken your heart or may worsen heart failure. It may also interfere with medicines you are taking.    Try not to get sick with a cold or the flu, which can be very serious if you have pulmonary hypertension. Stay away from people who are sick and get a flu shot every year. Ask your healthcare provider if you need a pneumococcal shot.    Women with PH should avoid getting pregnant.  Ask your provider:    How and when you will get your test results    If there are activities you should avoid and when you can return to your normal activities    How to take care of yourself at home    What symptoms or problems you should watch for and what to do if you have them  Make sure you know when you should come back for a checkup. Keep all appointments for provider visits or tests.  Developed by [employer].  Adult Advisor 2016.3 published by [employer].  Last modified: 2016-06-13  Last reviewed: 2014-12-08  This content is reviewed periodically and is subject to change as new health information becomes available. The information is intended to inform and educate and is not a replacement for medical evaluation, advice, diagnosis or treatment by a healthcare professional.  References   Adult Advisor 2016.3 Index    Copyright   2016 [employer], a division of [employer] All rights reserved.               Signed, Tk Hernandez MD  Internal Medicine & Pediatrics

## 2018-01-04 NOTE — TELEPHONE ENCOUNTER
Patient Information     Patient Name MRN Sex Milena Guerin [id number] Female 1945      Telephone Encounter by Madeline Ruano RN at 2017  1:58 PM     Author:  Madeline Ruano RN Service:  (none) Author Type:  NURS- Registered Nurse     Filed:  2017  2:25 PM Encounter Date:  2017 Status:  Signed     :  Madeline Ruano RN (NURS- Registered Nurse)            Request physician consideration to refill Prinzide as requested by pharmacy due to elevated creatinine per protocol - please refer to 3/21/17 OV with Dr. Hernandez.  Diuretic Combinations  Office visit in the past 12 months or per provider note.  Last visit with ALEXEY HAWKINS was on: 2016 in [facility]  [facility]  [facility]  Lab test requirements:  Creatinine and Potassium annually, if ordering lab, order BMP.  CREATININE (mg/dL)    Date Value   2017 1.52 (H)     POTASSIUM (mmol/L)    Date Value   2017 4.2   Max refill for 12 months from last office visit or per provider note.  Unable to complete prescription refill per RN Medication Refill Policy.................... Madeline Ruano RN ....................  2017   2:03 PM    Calcium Channel Blockers  Office visit in the past 12 months or per provider note.  Last visit with ALEXEY HAWKINS was on: 2016 in [facility]  [place] visit with [place] is on: 2017 in [facility]  Next visit with Family Practice is on: 2017 in [facility]  BP Readings from Last 4 Encounters:    17 138/74   17 126/72   16 136/70   04/08/15 138/72   Review last provider visit note.  If BP reviewed and plan is noted, can refill.  Max refill for 12 months from last office visit or per provider note.  Due for exam.  Limited refill per protocol. Upcoming OV scheduled for 17 Madeline Ruano RN ........   2017     2:01 PM    Beta Blockers   Office visit in the past 12 months or per provider note.  Last visit with ALEXEY HAWKINS was on: 2016 in [facility]  Next visit with PAULA HAWKINSIA is on: 2017 in [facility]  Next visit with Family Practice is on: 2017 in [facility]  Max refill for 12 months from last office visit or per provider note.  Due for exam.  Limited refill per protocol. Upcoming OV scheduled for 17 Madeline Ruano RN ........   2017    2:01 PM    Statins  Office visit in the past 12 months.  Last visit with ALEXEY HAWKINS was on: 2016 in [facility]  Next visit with ALEXEY HAWKINS is on: 2017 in [facility]  Lab testing requirements:  Lipids annually.  Repeat lipids 6-8 weeks after dosage or drug change.  Last Lipids:  Chol: 145    2016  T    2016  HDL:   53    2016  LDL:  62    2016  LDL DIRECT:  No results found in past 5 years    .  Max refills 12 months from last office visit.    Due for exam.  Limited refill per protocol. Upcoming OV scheduled for 17 Madeline Ruano RN ........   2017    2:01 PM    Refill request for Metformin inappropriate. Patient quit taking this medication-refer to 3/1/17 OV with Hetal Gonzalez PA-C

## 2018-01-04 NOTE — NURSING NOTE
Patient Information     Patient Name MRN Sex Milena Guerin [id number] Female 1945      Nursing Note by Yancy Ferguson at 2017 10:00 AM     Author:  Yancy Ferguson Service:  (none) Author Type:  (none)     Filed:  2017  9:54 AM Encounter Date:  2017 Status:  Signed     :  Yancy Ferguson            Patient here today for yearly physical  Yancy Ferguson LPN..............................2017  9:51 AM

## 2018-01-04 NOTE — PROGRESS NOTES
Patient Information     Patient Name MRN Sex Milena Guerin [id number] Female 1945      Progress Notes by Lilly Sotomayor at 2017 11:10 AM     Author:  Lilly Sotomayor Service:  (none) Author Type:  (none)     Filed:  2017 11:10 AM Date of Service:  2017 11:10 AM Status:  Signed     :  Lilly Sotomayor            Falls Risk Criteria:    Age 65 and older or under age 4        Sensory deficits    Poor vision    Use of ambulatory aides    Impaired judgment    Unable to walk independently    Meets High Risk criteria for falls:  Yes               1.  Do you have dizziness or vertigo?    no                    2.  Do you need help standing or walking?   no                 3.  Have you fallen within the last 6 months?    no           4.  Has the patient been fasting?      no       If any risks are marked Yes, the following interventions are utilized:    Do not leave patient unattended     Assist patient in the dressing room and bathroom    Have ambulatory aides available throughout procedure    Involve patient s family if available

## 2018-01-05 NOTE — PROGRESS NOTES
Patient Information     Patient Name MRN Sex Milena Guerin [id number] Female 1945      Progress Notes by An De La Rosa MD at 2017 10:00 AM     Author:  An De La Rosa MD Service:  (none) Author Type:  Physician     Filed:  5/15/2017  8:03 AM Encounter Date:  2017 Status:  Signed     :  An De La Rosa MD (Physician)            SUBJECTIVE:    Milena Contreras is a 71 y.o. female who presents for review of medications, chronic conditions and labs.    HPI: Milena Contreras is a 71 y.o. female presents for review of medications, chronic conditions and labs.  She was seen earlier this spring with increased lower extremity edema. Workup for this showed her creatinine to be elevated at 1.5. She also underwent echocardiogram, this showed pulmonary hypertension. She had a consultative visit with internal medicine regarding this. She is due today for recheck of her creatinine. Pulmonary hypertension was felt to be related to chronic hypertension, obesity, asthma, possible sleep apnea.      She has type 2 diabetes, hypertension, elevated uric acid levels and hyperlipidemia.  She doesn't exercise regularly.    HEMOGLOBIN A1C MONITORING (POCT)    Date Value   2017 6.5 % (H)   2013 9.0 % NGSP (H)       Current diabetes medications:  Restarted on Glucophage  She had stopped this during the winter as she was worried about affects on her kidneys.  Restarted a couple weeks ago after she had a blood sugar of 200 at home.      Last Lipids:  Chol: 2016 145   148  HDL: 2016 53   LDL: 2016 62    Home monitoring:not often   Aspirin:  yes  Statin:  Yes, Zocor   Last eye exam: has been over a year     Immunizations:  Up to date except for zostavax   Mammogram is scheduled for today.    Colon cancer screening       Asthma:  Feels more out of breath a lot.  3 weeks ago she had a cold that started as a cough that didn't seem to let up.   States is consistent with advair bid for the past 3 weeks too.      LE edema - increasing again recently, similar to a past episode.  Worried about her kidneys    FH and PMH reviewed and updated.    PROBLEM LIST:  Patient Active Problem List     Diagnosis  Code     HYPERTENSION I10     ASTHMA, PERSISTENT, MODERATE J45.909     PSORIASIS L40.9     OBESITY E66.9     HYPERLIPIDEMIA E78.5     HYPERURICEMIA R79.9     RENAL INSUFFICIENCY N28.9     ABDOMINAL INCISIONAL HERNIA K43.2     DIABETES MELLITUS, TYPE II, UNCONTROLLED E11.65     Bilateral primary osteoarthritis of knee M17.0     Mild pulmonary hypertension (HC) I27.2     PAST MEDICAL HISTORY:  Past Medical History:     Diagnosis  Date     Alopecia      Chronic kidney disease 2014    Cr 1.3; +proteinuria      DM2 (diabetes mellitus, type 2) (HC) 2012    DM2 - referred to [facility]       Hyperlipidemia      Hyperuricemia     With gout x3      Obesity      Persistent asthma      SURGICAL HISTORY:  Past Surgical History:      Procedure  Laterality Date     COLONOSCOPY SCREENING  01/2004    a few external hemorrhoids       COLONOSCOPY SCREENING  4/3/2014    diverticulosis fu 10 yrs       FRACTURE TREATMENT      Left finger ORIF for fracture       JIMMY AND BSO      JIMMY/BSO for uterine fibroids          SOCIAL HISTORY:  Social History     Social History        Marital status:       Spouse name: Alfredo     Number of children:  2     Years of education:  N/A     Occupational History          [employer]      Social History Main Topics        Smoking status:  Never Smoker     Smokeless tobacco:  Never Used     Alcohol use  No     Drug use:  No     Sexual activity:  Yes     Partners: Male     Other Topics  Concern     Not on file      Social History Narrative     Spouse Alfredo    Children    1 son, 1 daughter     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   previous had his own [employer] business.  This is her 3rd marriage.   has current disabilities from falling off a chair 11 years ago w/spinal cord bruise.             FAMILY HISTORY:  Family History       Problem   Relation Age of Onset     Cancer  Mother      Lung        Hypertension  Mother      Cancer-breast  Mother      Age 80       Stroke  Mother       of CVA age 79       Heart Disease  Father       of MI age 72       Asthma  Daughter      Hypertension  Sister      Hypertension       Kidney disease  No Family History      Blood Disease  No Family History      no blood clots       CURRENT MEDICATIONS:   Current Outpatient Prescriptions       Medication  Sig Dispense Refill     albuterol HFA (VENTOLIN HFA) 90 mcg/actuation inhaler Inhale 2 Puffs by mouth every 4 hours if needed for Shortness Of Breath or Wheezing. 1 Inhaler 11     allopurinol (ZYLOPRIM) 300 mg tablet Take 1 tablet by mouth once daily. 90 tablet 3     amLODIPine (NORVASC) 10 mg tablet TAKE ONE TABLET ORALLY   ONCE A DAY 90 tablet 0     aspirin chewable 81 mg chewable tablet Take 81 mg by mouth once daily with a meal.       atenolol (TENORMIN) 100 mg tablet TAKE ONE TABLET ORALLY   TWO TIMES A  tablet 0     blood sugar diagnostic (ASCENSIA CONTOUR) strip Testing twice daily E11.65 100 Strip 10     CALCIUM CARBONATE/VITAMIN D3 (CALCIUM WITH VITAMIN D ORAL) Take 1 Tab by mouth 2 times daily.       fluticasone-salmeterol (ADVAIR DISKUS) 250-50 mcg/Dose diskus inhaler Inhale 1 Puff by mouth 2 times daily. 3 Inhaler 2     lisinopril-hydrochlorothiazide, 20-25 mg, (PRINZIDE, ZESTORETIC) 20-25 mg per tablet TAKE ONE TABLET ORALLY   ONCE A DAY 90 tablet 0     omega-3 fatty acids-vitamin E (FISH OIL) 1,000 mg cap Take 1 capsule by mouth once daily.       simvastatin (ZOCOR) 20 mg tablet TAKE ONE TABLET ORALLY ATBEDTIME 90 tablet 0     No current facility-administered medications for this visit.      Medications have been reviewed by me and are current to the best of my knowledge and ability.    ALLERGIES:  Cats  "(fur, dander, saliva)     REVIEW OF SYSTEMS:  General: denies any general problems.  Eyes: glasses - past due for recheck   Ears/Nose/Throat: postnasal drip   Cardiovascular: known hypertension, pulmonary hypertension   Respiratory: asthma her whole adult life; up several times a night, naps during the day   Gastrointestinal: known hernia; not painful.  She had put off any repair of this due to need to take care of her     Genitourinary: denies problems  Musculoskeletal: LE joint pain  Skin: psoriasis history   Neurologic: denies problems  Psychiatric: denies problems  Endocrine: see above  Heme/Lymphatic: denies problems  Allergic/Immunologic: denies problems  PHQ Depression Screening 5/8/2017   Date of PHQ exam (doc flow) 5/8/2017   1. Lack of interest/pleasure 0 - Not at all   2. Feeling down/depressed 0 - Not at all   PHQ-2 TOTAL SCORE 0      OBJECTIVE:  /80  Pulse 66  Ht 1.626 m (5' 4\")  Wt 109.1 kg (240 lb 9.6 oz)  BMI 41.3 kg/m2  EXAM:   General Appearance: Pleasant, alert, appropriate appearance for age. No acute distress  Head Exam: Normal. Normocephalic, atraumatic.  Eye Exam:  Normal external eye, conjunctiva, lids, cornea. YURIDIA.  Ear Exam: Normal TM's bilaterally. Normal auditory canals and external ears. Non-tender. Wax removed from her right otic canal  Nose Exam: Normal external nose, mucus membranes, and septum.  OroPharynx Exam:  Normal   Neck Exam:  Supple, no masses or nodes.  Thyroid Exam: No nodules or enlargement.  Chest/Respiratory Exam: Normal chest wall and respirations. Clear to auscultation.  Breast Exam: No dimpling, nipple retraction or discharge. No masses or nodes.  Cardiovascular Exam: Regular rate and rhythm. S1, S2, no murmur, click, gallop, or rubs.  Gastrointestinal Exam: moderate size ventral/umbilical hernia    Musculoskeletal Exam: bilateral knee pain; 2+ LE edema; toenails thickened, sensation is intact  Skin: no rash or abnormalities  Neurologic Exam: " Nonfocal, symmetric DTRs, normal gross motor, tone coordination and no tremor.  Psychiatric Exam: Alert and oriented - appropriate affect.    Results for orders placed or performed in visit on 05/08/17      Hgb A1c      Result  Value Ref Range    HEMOGLOBIN A1C MONITORING (POCT) 6.5 (H) 4.0 - 6.2 %    ESTIMATED AVERAGE GLUCOSE  140 mg/dL   LIPID PANEL      Result  Value Ref Range    CHOLESTEROL,TOTAL 119 <200 mg/dL    TRIGLYCERIDES 117 <150 mg/dL    HDL CHOLESTEROL 50 23 - 92 mg/dL    NON-HDL CHOLESTEROL 69 <145 mg/dl    CHOL/HDL RATIO            2.38 <4.50                    LDL CHOLESTEROL 46 <100 mg/dL    PATIENT STATUS            FASTING                   COMPLETE METABOLIC PANEL      Result  Value Ref Range    SODIUM 138 133 - 143 mmol/L    POTASSIUM 4.7 3.5 - 5.1 mmol/L    CHLORIDE 103 98 - 107 mmol/L    CO2,TOTAL 24 21 - 31 mmol/L    ANION GAP 11 5 - 18                    GLUCOSE 131 (H) 70 - 105 mg/dL    CALCIUM 9.6 8.6 - 10.3 mg/dL    BUN 22 7 - 25 mg/dL    CREATININE 1.29 0.70 - 1.30 mg/dL    BUN/CREAT RATIO           17                    GFR if African American 49 (L) >60 ml/min/1.73m2    GFR if not  41 (L) >60 ml/min/1.73m2    ALBUMIN 3.9 3.5 - 5.7 g/dL    PROTEIN,TOTAL 7.0 6.4 - 8.9 g/dL    GLOBULIN                  3.1 2.0 - 3.7 g/dL    A/G RATIO 1.3 1.0 - 2.0                    BILIRUBIN,TOTAL 0.5 0.3 - 1.0 mg/dL    ALK PHOSPHATASE 66 34 - 104 IU/L    ALT (SGPT) 17 7 - 52 IU/L    AST (SGOT) 16 13 - 39 IU/L   HEMOGLOBIN      Result  Value Ref Range    HEMOGLOBIN                12.3 12.0 - 16.0 g/dL    MCV                       90 80 - 100 fL   URIC ACID      Result  Value Ref Range    URIC ACID 5.3 4.4 - 7.6 mg/dL   URINALYSIS W REFLEX MICROSCOPIC IF POSITIVE      Result  Value Ref Range    COLOR                     Yellow Yellow Color    CLARITY                   Clear Clear Clarity    SPECIFIC GRAVITY,URINE    1.020 1.010, 1.015, 1.020, 1.025                    PH,URINE                   6.0 6.0, 7.0, 8.0, 5.5, 6.5, 7.5, 8.5                    UROBILINOGEN,QUALITATIVE  Normal Normal EU/dl    PROTEIN, URINE 100 (A) Negative mg/dL    GLUCOSE, URINE Negative Negative mg/dL    KETONES,URINE             Negative Negative mg/dL    BILIRUBIN,URINE           Negative Negative                    OCCULT BLOOD,URINE        Small (A) Negative                    NITRITE                   Negative Negative                    LEUKOCYTE ESTERASE        Negative Negative                   URINALYSIS MICROSCOPIC      Result  Value Ref Range    RBC 0-2 0-2, None Seen /HPF    WBC None Seen 0-2, 3-5, None Seen /HPF    BACTERIA                  Rare None Seen, Rare, Occasional, Few Bacteria/HPF    EPITHELIAL CELLS          None Seen None Seen, Few Epi/HPF       ASSESSMENT/PLAN    ICD-10-CM    1. Mild pulmonary hypertension (HC) I27.2    2. Incisional hernia, without obstruction or gangrene K43.2    3. RENAL INSUFFICIENCY N28.9 Hgb A1c      LIPID PANEL      COMPLETE METABOLIC PANEL      URINALYSIS W REFLEX MICROSCOPIC IF POSITIVE      MICROALBUMIN RANDOM URINE      HEMOGLOBIN      URIC ACID      Hgb A1c      LIPID PANEL      COMPLETE METABOLIC PANEL      HEMOGLOBIN      URIC ACID      URINALYSIS W REFLEX MICROSCOPIC IF POSITIVE      MICROALBUMIN RANDOM URINE      URINALYSIS MICROSCOPIC      URINALYSIS MICROSCOPIC   4. Uncontrolled type 2 diabetes mellitus with hyperglycemia, without long-term current use of insulin (HC) E11.65 Hgb A1c      LIPID PANEL      COMPLETE METABOLIC PANEL      URINALYSIS W REFLEX MICROSCOPIC IF POSITIVE      MICROALBUMIN RANDOM URINE      HEMOGLOBIN      Hgb A1c      LIPID PANEL      COMPLETE METABOLIC PANEL      HEMOGLOBIN      URINALYSIS W REFLEX MICROSCOPIC IF POSITIVE      MICROALBUMIN RANDOM URINE      URINALYSIS MICROSCOPIC      URINALYSIS MICROSCOPIC   5. HYPERTENSION I10 Hgb A1c      LIPID PANEL      COMPLETE METABOLIC PANEL      URINALYSIS W REFLEX MICROSCOPIC IF POSITIVE       MICROALBUMIN RANDOM URINE      HEMOGLOBIN      Hgb A1c      LIPID PANEL      COMPLETE METABOLIC PANEL      HEMOGLOBIN      URINALYSIS W REFLEX MICROSCOPIC IF POSITIVE      MICROALBUMIN RANDOM URINE      URINALYSIS MICROSCOPIC      URINALYSIS MICROSCOPIC   6. Moderate persistent asthma without complication J45.40 fluticasone-salmeterol (ADVAIR DISKUS) 250-50 mcg/Dose diskus inhaler   7. Hyperlipidemia, unspecified hyperlipidemia type E78.5 Hgb A1c      LIPID PANEL      COMPLETE METABOLIC PANEL      URINALYSIS W REFLEX MICROSCOPIC IF POSITIVE      MICROALBUMIN RANDOM URINE      HEMOGLOBIN      simvastatin (ZOCOR) 20 mg tablet      Hgb A1c      LIPID PANEL      COMPLETE METABOLIC PANEL      HEMOGLOBIN      URINALYSIS W REFLEX MICROSCOPIC IF POSITIVE      MICROALBUMIN RANDOM URINE      URINALYSIS MICROSCOPIC      URINALYSIS MICROSCOPIC   8. HYPERURICEMIA R79.9 allopurinol (ZYLOPRIM) 300 mg tablet   9. Essential hypertension I10 amLODIPine (NORVASC) 10 mg tablet      atenolol (TENORMIN) 100 mg tablet      lisinopril-hydrochlorothiazide, 20-25 mg, (PRINZIDE, ZESTORETIC) 20-25 mg per tablet   10. Encounter for screening mammogram for breast cancer Z12.31 XR MAMMO BILAT SCREENING     Patient's Body mass index is 41.3 kg/(m^2). Normal BMIs for ages 18-64 is between 18.5 and 24.9; normal range for ages 65+ is 23-30 so this BMI is out of the normal range. To lose weight we reviewed risks and benefits of appropriate options such as diet, exercise, and medications. Patient's strategy will be  none; patient is not ready to act     1.  Mammogram today.  2.. I have personally reviewed the labs listed above.  3.  Will discuss with pt options regarding metformin vs other diabetes mellitus medication.  4.  Eye and dentist visits recommended.  5.  BP is at goal.  6.  Continue statin therapy.  7.  Consider sleep study for additional evaluation of pulmonary hypertension - pt not ready to act on this at this time.  8.  Continue same  hypertension medications.  9.  Flu vaccine this fall  10. Continue same asthma medications.    An De La Rosa MD

## 2018-01-05 NOTE — PATIENT INSTRUCTIONS
Patient Information     Patient Name MRN Sex Milena Guerin [id number] Female 1945      Patient Instructions by An De La Rosa MD at 2017 10:00 AM     Author:  An De La Rosa MD Service:  (none) Author Type:  Physician     Filed:  5/15/2017  8:02 AM Encounter Date:  2017 Status:  Signed     :  An De La Rosa MD (Physician)            Please have your eyes checked this summer.  Letter will be sent with your labs.

## 2018-01-25 ENCOUNTER — DOCUMENTATION ONLY (OUTPATIENT)
Dept: FAMILY MEDICINE | Facility: OTHER | Age: 73
End: 2018-01-25

## 2018-01-25 PROBLEM — I44.7 LBBB (LEFT BUNDLE BRANCH BLOCK): Status: ACTIVE | Noted: 2017-06-15

## 2018-01-25 PROBLEM — E66.9 OBESITY: Status: ACTIVE | Noted: 2018-01-25

## 2018-01-25 PROBLEM — J45.909 ASTHMA: Status: ACTIVE | Noted: 2018-01-25

## 2018-01-25 PROBLEM — I10 HYPERTENSION: Status: ACTIVE | Noted: 2018-01-25

## 2018-01-25 PROBLEM — I27.20 MILD PULMONARY HYPERTENSION (H): Status: ACTIVE | Noted: 2017-03-21

## 2018-01-25 PROBLEM — E78.5 HYPERLIPIDEMIA: Status: ACTIVE | Noted: 2018-01-25

## 2018-01-25 PROBLEM — R79.9 ABNORMAL BLOOD CHEMISTRY: Status: ACTIVE | Noted: 2018-01-25

## 2018-01-25 PROBLEM — L40.9 PSORIASIS: Status: ACTIVE | Noted: 2018-01-25

## 2018-01-25 RX ORDER — ASPIRIN 81 MG/1
81 TABLET, CHEWABLE ORAL
COMMUNITY

## 2018-01-25 RX ORDER — CHLORAL HYDRATE 500 MG
1 CAPSULE ORAL DAILY
Status: ON HOLD | COMMUNITY
End: 2024-01-09

## 2018-01-25 RX ORDER — CARVEDILOL 6.25 MG/1
1 TABLET ORAL 2 TIMES DAILY WITH MEALS
COMMUNITY
Start: 2017-08-01 | End: 2018-07-31

## 2018-01-25 RX ORDER — AMLODIPINE BESYLATE 10 MG/1
10 TABLET ORAL DAILY
COMMUNITY
Start: 2017-05-08 | End: 2018-07-10

## 2018-01-25 RX ORDER — ALLOPURINOL 100 MG/1
1 TABLET ORAL DAILY
COMMUNITY
Start: 2017-08-01 | End: 2018-07-31

## 2018-01-25 RX ORDER — ALBUTEROL SULFATE 90 UG/1
2 AEROSOL, METERED RESPIRATORY (INHALATION) EVERY 4 HOURS PRN
COMMUNITY
Start: 2016-05-09 | End: 2018-08-15

## 2018-01-25 RX ORDER — LISINOPRIL AND HYDROCHLOROTHIAZIDE 20; 25 MG/1; MG/1
1 TABLET ORAL DAILY
COMMUNITY
Start: 2017-05-08 | End: 2018-07-12

## 2018-01-25 RX ORDER — SIMVASTATIN 20 MG
20 TABLET ORAL AT BEDTIME
COMMUNITY
Start: 2017-05-08 | End: 2018-07-10

## 2018-01-26 VITALS
WEIGHT: 240.6 LBS | HEART RATE: 66 BPM | DIASTOLIC BLOOD PRESSURE: 80 MMHG | BODY MASS INDEX: 41.07 KG/M2 | HEIGHT: 64 IN | SYSTOLIC BLOOD PRESSURE: 130 MMHG

## 2018-01-26 VITALS
HEART RATE: 68 BPM | WEIGHT: 235 LBS | SYSTOLIC BLOOD PRESSURE: 152 MMHG | BODY MASS INDEX: 41.66 KG/M2 | DIASTOLIC BLOOD PRESSURE: 74 MMHG | DIASTOLIC BLOOD PRESSURE: 82 MMHG | SYSTOLIC BLOOD PRESSURE: 138 MMHG | HEART RATE: 68 BPM | RESPIRATION RATE: 20 BRPM | WEIGHT: 235.2 LBS

## 2018-01-26 VITALS
BODY MASS INDEX: 40.57 KG/M2 | DIASTOLIC BLOOD PRESSURE: 72 MMHG | WEIGHT: 229 LBS | HEART RATE: 64 BPM | TEMPERATURE: 97.4 F | SYSTOLIC BLOOD PRESSURE: 126 MMHG

## 2018-04-27 DIAGNOSIS — I10 ESSENTIAL HYPERTENSION: Primary | ICD-10-CM

## 2018-04-27 DIAGNOSIS — R79.9 ABNORMAL BLOOD CHEMISTRY: ICD-10-CM

## 2018-04-27 RX ORDER — ALLOPURINOL 100 MG/1
100 TABLET ORAL DAILY
Qty: 30 TABLET | OUTPATIENT
Start: 2018-04-27

## 2018-04-27 RX ORDER — CARVEDILOL 6.25 MG/1
6.25 TABLET ORAL 2 TIMES DAILY WITH MEALS
Qty: 60 TABLET | OUTPATIENT
Start: 2018-04-27

## 2018-04-27 NOTE — TELEPHONE ENCOUNTER
Filled 08/01/17 for a year. Due 08/01/18. Pharmacy alerted. Unable to complete prescription refill per RNMedication Refill Policy.................... Madeline Ruano ....................  4/27/2018   8:35 AM      allopurinol (ZYLOPRIM) 100 mg tablet 90 tablet 3 8/1/2017     Sig: Take 1 tab orally once daily    Class: eRx    E-Prescribing Status: Receipt confirmed by pharmacy (8/1/2017 12:21 PM CDT)      [place] [id number]     carvedilol (COREG) 6.25 mg tablet 180 tablet 3 8/1/2017     Sig: Take 1 tab orally twice daily with food    Class: eRx    E-Prescribing Status: Receipt confirmed by pharmacy (8/1/2017 12:21 PM CDT)      [place] [address]

## 2018-06-26 DIAGNOSIS — J45.909 MILD ASTHMA WITHOUT COMPLICATION, UNSPECIFIED WHETHER PERSISTENT: Primary | ICD-10-CM

## 2018-06-28 NOTE — TELEPHONE ENCOUNTER
Hi ,  Routing refill request to provider for review/approval because:  Medication is reported/historical  Protocol also requires updated Asthma control test.   Pt also due for annual with PCP   Please review and advise or sign if appropriate  Unable to complete prescription refill per RN Medication Refill Policy.................... Janell Jurado ....................  6/28/2018   12:59 PM

## 2018-07-06 DIAGNOSIS — E78.5 HYPERLIPIDEMIA, UNSPECIFIED HYPERLIPIDEMIA TYPE: ICD-10-CM

## 2018-07-06 DIAGNOSIS — I10 ESSENTIAL HYPERTENSION: ICD-10-CM

## 2018-07-06 DIAGNOSIS — E11.65 TYPE 2 DIABETES MELLITUS WITH HYPERGLYCEMIA (H): Primary | ICD-10-CM

## 2018-07-10 RX ORDER — SIMVASTATIN 20 MG
20 TABLET ORAL AT BEDTIME
Qty: 90 TABLET | Refills: 3 | Status: SHIPPED | OUTPATIENT
Start: 2018-07-10 | End: 2024-01-02

## 2018-07-10 RX ORDER — AMLODIPINE BESYLATE 10 MG/1
10 TABLET ORAL DAILY
Qty: 90 TABLET | Refills: 3 | Status: SHIPPED | OUTPATIENT
Start: 2018-07-10

## 2018-07-10 NOTE — TELEPHONE ENCOUNTER
[facility] is requesting an advanced refill authorization.  This is to ensure uninterrupted availability of this prescription.  The patient has just received the last refill of these medications.  Wong Porras LPN ............... 7/10/2018, 12:49 PM

## 2018-07-10 NOTE — TELEPHONE ENCOUNTER
Last office visit with pcp and labs 5/8/2017, patient notified over due for follow up and labs, she said that she was expecting our call.  She has about 5 days worth of medication in her pill box.  Transferred to clinic scheduler to schedule next available appointment that works for her.  Patient asked for enough medication to get her to next appointment.  All medications reported historical.  Routing to PCP to address appropriate refill at this time.  Appointment scheduled for 8-15-18.  Unable to complete prescription refill per RN Medication Refill Policy. Bisi Yuen 7/10/2018 2:02 PM

## 2018-07-12 DIAGNOSIS — I10 HYPERTENSION: ICD-10-CM

## 2018-07-12 DIAGNOSIS — I27.20 MILD PULMONARY HYPERTENSION (H): Primary | ICD-10-CM

## 2018-07-17 ENCOUNTER — TELEPHONE (OUTPATIENT)
Dept: FAMILY MEDICINE | Facility: OTHER | Age: 73
End: 2018-07-17

## 2018-07-17 RX ORDER — LISINOPRIL AND HYDROCHLOROTHIAZIDE 20; 25 MG/1; MG/1
TABLET ORAL
Qty: 30 TABLET | Refills: 0 | Status: SHIPPED | OUTPATIENT
Start: 2018-07-17 | End: 2018-08-15

## 2018-07-17 NOTE — TELEPHONE ENCOUNTER
Patient notified that prescription was sent.    Fifi Francisco LPN.................. 7/17/2018 10:18 AM

## 2018-07-17 NOTE — TELEPHONE ENCOUNTER
"Refill request from [facility] for:  lisinopril-hydrochlorothiazide (PRINZIDE/ZESTORETIC) 20-25 MG per tablet    LOV 6/15/2017 with Ezequiel Aponte MD  \"Recommend that she continue with her current regimen. She'll follow-up with her primary physician as scheduled for diabetes and high blood pressure follow-up. She'll continue monitoring her blood pressure and if it elevates or if her heart rate is abnormal she will follow-up for reassessment.\"    LOV with PCP 5/8/2017  \"Pulmonary hypertension was felt to be related to chronic hypertension, obesity, asthma, possible sleep apnea.\"  \"Continue same hypertension medications\"    Last refill 5/8/2017 90 tablets X 3    Patient is overdue for an annual exam/medication review    UPCOMING APPT NOTED 8/15/2018    Will provide limited refill to get patient through to appt.    Requested Prescriptions   Pending Prescriptions Disp Refills     lisinopril-hydrochlorothiazide (PRINZIDE/ZESTORETIC) 20-25 MG per tablet [Pharmacy Med Name: LISINOPRIL-HCTZ 20-25 MG TA 20-25 TAB] 90 tablet 3     Sig: TAKE ONE TABLET ORALLY ONCE A DAY    Diuretics (Including Combos) Protocol Failed    7/12/2018 10:26 AM       Failed - Blood pressure under 140/90 in past 12 months    BP Readings from Last 3 Encounters:   06/15/17 152/82   05/08/17 130/80   03/21/17 138/74            Failed - Recent (12 mo) or future (30 days) visit within the authorizing provider's specialty    Patient had office visit in the last 12 months or has a visit in the next 30 days with authorizing provider or within the authorizing provider's specialty.  See \"Patient Info\" tab in inbasket, or \"Choose Columns\" in Meds & Orders section of the refill encounter.           Failed - Normal serum creatinine on file in past 12 months    Recent Labs   Lab Test  05/08/17   1106   CR  1.29          Failed - Normal serum potassium on file in past 12 months    Recent Labs   Lab Test  05/08/17   1106   POTASSIUM  4.7             Failed - Normal " serum sodium on file in past 12 months    Recent Labs   Lab Test  05/08/17   1106   NA  138             Passed - Patient is age 18 or older       Passed - No active pregancy on record       Passed - No positive pregnancy test in past 12 months        Tiffanie Sage RN  ....................  7/17/2018   9:34 AM

## 2018-07-17 NOTE — TELEPHONE ENCOUNTER
Milena called and has been trying to get her 4th medication refilled for a few days now. It is the Lisinoprilhtz, she's wondering if it is completed and sent [facility] in [place]?

## 2018-07-23 NOTE — PROGRESS NOTES
Patient Information     Patient Name  Milena Contreras MRN  [id number] Sex  Female   1945      Letter by Godfrey Valverde MD at      Author:  Godfrey Valverde MD Service:  (none) Author Type:  (none)    Filed:   Date of Service:   Status:  (Other)       [facility]  [address]  [place] [address]  [phone]         Milena Contreras   [address]  [facility] [address]      May 10, 2017  Date of Breast Imagin2017 11:14 AM    Dear Ms. Contreras:    We are pleased to inform you that the result of your recent breast imaging examination is normal/benign (not cancer).    A report of your results was sent to your health care provider(s).    Your images will become part of your medical file here at [facility] and will be available for your continuing care. You are responsible for informing any new health care provider or breast imaging facility of the date and location of this examination.    Although mammography is the most accurate method for early detection, not all cancers are found through mammography. If you notice any new changes in your breast(s) please inform your health care provider without delay.    Thank you for choosing [facility] to participate in your healthcare needs.         [facility] Recommendations for Early Breast Cancer Detection   in Women without Symptoms  When to start having mammograms to screen for breast cancer, and how often to have them, is a personal decision. It should be based on your preferences, your values and your risk for developing breast cancer. [facility] recommends that you and your health care provider together determine when mammograms are right for you.    [facility] recommends the following guidelines for women who have an average risk for breast cancer, based on American Cancer Society guidelines:    Age 40 to 44:  Mammograms are optional.     Age 45 to 54: Have a mammogram every year.           Age 55 and older: Have a mammogram every year, or transition to having one every 2 years. Continue to have mammograms as long as your health is good.    If you have a higher than average risk for breast cancer, your health care provider may recommend a different schedule.

## 2018-07-24 NOTE — PROGRESS NOTES
Patient Information     Patient Name  Milena Contreras MRN  [id number] Sex  Female   1945      Letter by An Mills MD at      Author:  An Mills MD Service:  (none) Author Type:  (none)    Filed:   Encounter Date:  2017 Status:  (Other)           Milena Contreras  [address] [facility] [address]          May 12, 2017    Dear Ms. Contreras:    Here is a copy of your recent labs:    Results for orders placed or performed in visit on 17      Hgb A1c      Result  Value Ref Range    HEMOGLOBIN A1C MONITORING (POCT) 6.5 (H) 4.0 - 6.2 %    ESTIMATED AVERAGE GLUCOSE  140 mg/dL   LIPID PANEL      Result  Value Ref Range    CHOLESTEROL,TOTAL 119 <200 mg/dL    TRIGLYCERIDES 117 <150 mg/dL    HDL CHOLESTEROL 50 23 - 92 mg/dL    NON-HDL CHOLESTEROL 69 <145 mg/dl    CHOL/HDL RATIO            2.38 <4.50                    LDL CHOLESTEROL 46 <100 mg/dL    PATIENT STATUS            FASTING                   COMPLETE METABOLIC PANEL      Result  Value Ref Range    SODIUM 138 133 - 143 mmol/L    POTASSIUM 4.7 3.5 - 5.1 mmol/L    CHLORIDE 103 98 - 107 mmol/L    CO2,TOTAL 24 21 - 31 mmol/L    ANION GAP 11 5 - 18                    GLUCOSE 131 (H) 70 - 105 mg/dL    CALCIUM 9.6 8.6 - 10.3 mg/dL    BUN 22 7 - 25 mg/dL    CREATININE 1.29 0.70 - 1.30 mg/dL    BUN/CREAT RATIO           17                    GFR if African American 49 (L) >60 ml/min/1.73m2    GFR if not  41 (L) >60 ml/min/1.73m2    ALBUMIN 3.9 3.5 - 5.7 g/dL    PROTEIN,TOTAL 7.0 6.4 - 8.9 g/dL    GLOBULIN                  3.1 2.0 - 3.7 g/dL    A/G RATIO 1.3 1.0 - 2.0                    BILIRUBIN,TOTAL 0.5 0.3 - 1.0 mg/dL    ALK PHOSPHATASE 66 34 - 104 IU/L    ALT (SGPT) 17 7 - 52 IU/L    AST (SGOT) 16 13 - 39 IU/L   HEMOGLOBIN      Result  Value Ref Range    HEMOGLOBIN                12.3 12.0 - 16.0 g/dL    MCV                       90 80 - 100 fL   URIC ACID      Result  Value Ref Range    URIC ACID  5.3 4.4 - 7.6 mg/dL   URINALYSIS W REFLEX MICROSCOPIC IF POSITIVE      Result  Value Ref Range    COLOR                     Yellow Yellow Color    CLARITY                   Clear Clear Clarity    SPECIFIC GRAVITY,URINE    1.020 1.010, 1.015, 1.020, 1.025                    PH,URINE                  6.0 6.0, 7.0, 8.0, 5.5, 6.5, 7.5, 8.5                    UROBILINOGEN,QUALITATIVE  Normal Normal EU/dl    PROTEIN, URINE 100 (A) Negative mg/dL    GLUCOSE, URINE Negative Negative mg/dL    KETONES,URINE             Negative Negative mg/dL    BILIRUBIN,URINE           Negative Negative                    OCCULT BLOOD,URINE        Small (A) Negative                    NITRITE                   Negative Negative                    LEUKOCYTE ESTERASE        Negative Negative                   MICROALBUMIN RANDOM URINE      Result  Value Ref Range    ALB RAND URINE            1356.1 mg/L    CREATININE,URINE          0.62 g/L    MICROALBUMIN,RAND UR      2187.3 (H) <30.0 mg/g creat   URINALYSIS MICROSCOPIC      Result  Value Ref Range    RBC 0-2 0-2, None Seen /HPF    WBC None Seen 0-2, 3-5, None Seen /HPF    BACTERIA                  Rare None Seen, Rare, Occasional, Few Bacteria/HPF    EPITHELIAL CELLS          None Seen None Seen, Few Epi/HPF     Your creatinine, kidney function test, has improved since last checked.  Your cholesterol panel results are normal.  Your a1c, diabetes mellitus test, shows good control of blood sugars.  I remember that you had stopped and then restarted your metformin.  I would recommend that you continue to take your metformin at half of your old dose.  The main lab to point out to you is related to protein in your urine.  This is typically related to the effects of kidney disease and high blood pressure. This has worsened from your last check. Rather then going the whole year before having this checked again, I would repeat it the end of this summer. Continue to keep blood sugars under good  control and her blood pressure under control her some the best ways to help protect your kidneys    Sincerely,  An De La Rosa MD [facility] 5/12/2017 5:29 PM

## 2018-07-24 NOTE — PROGRESS NOTES
Patient Information     Patient Name  Milena Contreras MRN  [id number] Sex  Female   1945      Letter by An Mills MD at      Author:  An Mills MD Service:  (none) Author Type:  (none)    Filed:   Encounter Date:  2017 Status:  (Other)           [facility]  [address] [facility] [address]          2017    Dear Ms. Contreras:    This is to remind you that you are due for your follow up labs and an office visit with An De La Rosa MD. Your last visit was on 2017.     Additional refills of your medication require you to complete this visit.    Please call [phone] to schedule your appointment.    Thank you for choosing [facility] for your health care needs.    Sincerely,      Refill SRINIVASAN  [facility]

## 2018-07-31 DIAGNOSIS — I10 ESSENTIAL HYPERTENSION: ICD-10-CM

## 2018-07-31 DIAGNOSIS — R79.9 ABNORMAL BLOOD CHEMISTRY: Primary | ICD-10-CM

## 2018-08-01 RX ORDER — CARVEDILOL 6.25 MG/1
TABLET ORAL
Qty: 180 TABLET | Refills: 3 | Status: SHIPPED | OUTPATIENT
Start: 2018-08-01 | End: 2019-07-29

## 2018-08-01 RX ORDER — ALLOPURINOL 100 MG/1
TABLET ORAL
Qty: 90 TABLET | Refills: 3 | Status: SHIPPED | OUTPATIENT
Start: 2018-08-01 | End: 2024-01-02

## 2018-08-01 NOTE — TELEPHONE ENCOUNTER
This is a Refill request from: [facility]  Name of Medication and Dose:  Allopurinol 100mg  Quantity requested:  90  Last fill date: 04/24/18  Last Office Visit: 6-15-17   PCP:  An Alves  Narcotic contract: none  Associate diagnosis:     This is a Refill request from: [facility]  Name of Medication and Dose:  Carvedilol 6.25mg  Quantity requested:  180  Last fill date: 04/24/18  Last Office Visit: 6-15-17   PCP:  An Alves  Narcotic contract: no  Associate diagnosis: hypertension    Yulia Clay LPN.........................8/1/2018  11:05 AM

## 2018-08-15 ENCOUNTER — HOSPITAL ENCOUNTER (OUTPATIENT)
Dept: GENERAL RADIOLOGY | Facility: OTHER | Age: 73
Discharge: HOME | End: 2018-08-15
Attending: FAMILY MEDICINE | Admitting: FAMILY MEDICINE
Payer: MEDICARE

## 2018-08-15 ENCOUNTER — HOSPITAL ENCOUNTER (OUTPATIENT)
Dept: GENERAL RADIOLOGY | Facility: OTHER | Age: 73
End: 2018-08-15
Attending: FAMILY MEDICINE
Payer: MEDICARE

## 2018-08-15 ENCOUNTER — OFFICE VISIT (OUTPATIENT)
Dept: FAMILY MEDICINE | Facility: OTHER | Age: 73
End: 2018-08-15
Attending: FAMILY MEDICINE
Payer: MEDICARE

## 2018-08-15 VITALS
WEIGHT: 224 LBS | HEART RATE: 68 BPM | SYSTOLIC BLOOD PRESSURE: 128 MMHG | HEIGHT: 63 IN | DIASTOLIC BLOOD PRESSURE: 80 MMHG | BODY MASS INDEX: 39.69 KG/M2

## 2018-08-15 DIAGNOSIS — E78.5 HYPERLIPIDEMIA, UNSPECIFIED HYPERLIPIDEMIA TYPE: ICD-10-CM

## 2018-08-15 DIAGNOSIS — J45.909 MILD ASTHMA WITHOUT COMPLICATION, UNSPECIFIED WHETHER PERSISTENT: Primary | ICD-10-CM

## 2018-08-15 DIAGNOSIS — Z12.31 ENCOUNTER FOR SCREENING MAMMOGRAM FOR BREAST CANCER: ICD-10-CM

## 2018-08-15 DIAGNOSIS — K43.2 INCISIONAL HERNIA, WITHOUT OBSTRUCTION OR GANGRENE: ICD-10-CM

## 2018-08-15 DIAGNOSIS — M17.0 PRIMARY OSTEOARTHRITIS OF BOTH KNEES: ICD-10-CM

## 2018-08-15 DIAGNOSIS — I27.20 MILD PULMONARY HYPERTENSION (H): ICD-10-CM

## 2018-08-15 DIAGNOSIS — N28.9 RENAL INSUFFICIENCY SYNDROME: ICD-10-CM

## 2018-08-15 DIAGNOSIS — I10 ESSENTIAL HYPERTENSION: ICD-10-CM

## 2018-08-15 LAB
ALBUMIN SERPL-MCNC: 4.1 G/DL (ref 3.5–5.7)
ALBUMIN UR-MCNC: 100 MG/DL
ALP SERPL-CCNC: 64 U/L (ref 34–104)
ALT SERPL W P-5'-P-CCNC: 13 U/L (ref 7–52)
ANION GAP SERPL CALCULATED.3IONS-SCNC: 8 MMOL/L (ref 3–14)
APPEARANCE UR: CLEAR
AST SERPL W P-5'-P-CCNC: 14 U/L (ref 13–39)
BILIRUB SERPL-MCNC: 0.6 MG/DL (ref 0.3–1)
BILIRUB UR QL STRIP: NEGATIVE
BUN SERPL-MCNC: 28 MG/DL (ref 7–25)
CALCIUM SERPL-MCNC: 9.9 MG/DL (ref 8.6–10.3)
CHLORIDE SERPL-SCNC: 101 MMOL/L (ref 98–107)
CHOLEST SERPL-MCNC: 160 MG/DL
CO2 SERPL-SCNC: 32 MMOL/L (ref 21–31)
COLOR UR AUTO: YELLOW
CREAT SERPL-MCNC: 1.49 MG/DL (ref 0.6–1.2)
CREAT UR-MCNC: 106 MG/DL
ERYTHROCYTE [DISTWIDTH] IN BLOOD BY AUTOMATED COUNT: 15 % (ref 10–15)
GFR SERPL CREATININE-BSD FRML MDRD: 34 ML/MIN/1.7M2
GLUCOSE SERPL-MCNC: 145 MG/DL (ref 70–105)
GLUCOSE UR STRIP-MCNC: NEGATIVE MG/DL
HBA1C MFR BLD: 6.9 % (ref 4–6)
HCT VFR BLD AUTO: 39.5 % (ref 35–47)
HDLC SERPL-MCNC: 54 MG/DL (ref 23–92)
HGB BLD-MCNC: 12.7 G/DL (ref 11.7–15.7)
HGB UR QL STRIP: ABNORMAL
KETONES UR STRIP-MCNC: NEGATIVE MG/DL
LDLC SERPL CALC-MCNC: 84 MG/DL
LEUKOCYTE ESTERASE UR QL STRIP: NEGATIVE
MCH RBC QN AUTO: 28.5 PG (ref 26.5–33)
MCHC RBC AUTO-ENTMCNC: 32.2 G/DL (ref 31.5–36.5)
MCV RBC AUTO: 89 FL (ref 78–100)
MICROALBUMIN UR-MCNC: 728 MG/L
MICROALBUMIN/CREAT UR: 686.79 MG/G CR (ref 0–25)
NITRATE UR QL: NEGATIVE
NONHDLC SERPL-MCNC: 106 MG/DL
PH UR STRIP: 6 PH (ref 5–9)
PLATELET # BLD AUTO: 243 10E9/L (ref 150–450)
POTASSIUM SERPL-SCNC: 4.3 MMOL/L (ref 3.5–5.1)
PROT SERPL-MCNC: 7.2 G/DL (ref 6.4–8.9)
RBC # BLD AUTO: 4.46 10E12/L (ref 3.8–5.2)
RBC #/AREA URNS AUTO: NORMAL /HPF
SODIUM SERPL-SCNC: 141 MMOL/L (ref 134–144)
SOURCE: ABNORMAL
SP GR UR STRIP: 1.02 (ref 1–1.03)
TRIGL SERPL-MCNC: 111 MG/DL
TSH SERPL DL<=0.05 MIU/L-ACNC: 0.96 IU/ML (ref 0.34–5.6)
UROBILINOGEN UR STRIP-ACNC: 0.2 EU/DL (ref 0.2–1)
WBC # BLD AUTO: 10.6 10E9/L (ref 4–11)
WBC #/AREA URNS AUTO: NORMAL /HPF

## 2018-08-15 PROCEDURE — 85027 COMPLETE CBC AUTOMATED: CPT | Performed by: FAMILY MEDICINE

## 2018-08-15 PROCEDURE — G0463 HOSPITAL OUTPT CLINIC VISIT: HCPCS

## 2018-08-15 PROCEDURE — 80061 LIPID PANEL: CPT | Performed by: FAMILY MEDICINE

## 2018-08-15 PROCEDURE — 99215 OFFICE O/P EST HI 40 MIN: CPT | Performed by: FAMILY MEDICINE

## 2018-08-15 PROCEDURE — 73562 X-RAY EXAM OF KNEE 3: CPT | Mod: 50

## 2018-08-15 PROCEDURE — G0463 HOSPITAL OUTPT CLINIC VISIT: HCPCS | Mod: 25

## 2018-08-15 PROCEDURE — 82043 UR ALBUMIN QUANTITATIVE: CPT | Performed by: FAMILY MEDICINE

## 2018-08-15 PROCEDURE — 36415 COLL VENOUS BLD VENIPUNCTURE: CPT | Performed by: FAMILY MEDICINE

## 2018-08-15 PROCEDURE — 84443 ASSAY THYROID STIM HORMONE: CPT | Performed by: FAMILY MEDICINE

## 2018-08-15 PROCEDURE — 80053 COMPREHEN METABOLIC PANEL: CPT | Performed by: FAMILY MEDICINE

## 2018-08-15 PROCEDURE — 83036 HEMOGLOBIN GLYCOSYLATED A1C: CPT | Performed by: FAMILY MEDICINE

## 2018-08-15 PROCEDURE — 81001 URINALYSIS AUTO W/SCOPE: CPT | Performed by: FAMILY MEDICINE

## 2018-08-15 RX ORDER — LISINOPRIL AND HYDROCHLOROTHIAZIDE 20; 25 MG/1; MG/1
TABLET ORAL
Qty: 90 TABLET | Refills: 3 | Status: SHIPPED | OUTPATIENT
Start: 2018-08-15 | End: 2024-01-02

## 2018-08-15 RX ORDER — ALBUTEROL SULFATE 90 UG/1
2 AEROSOL, METERED RESPIRATORY (INHALATION) EVERY 4 HOURS PRN
Qty: 2 INHALER | Refills: 11 | Status: SHIPPED | OUTPATIENT
Start: 2018-08-15 | End: 2020-03-24

## 2018-08-15 ASSESSMENT — PAIN SCALES - GENERAL: PAINLEVEL: NO PAIN (0)

## 2018-08-15 NOTE — NURSING NOTE
Patient is here for her yearly medical check.    Previous A1C is at goal of <8  No results found for: A1C  Urine microalbumin:creatine: 0.62  Foot exam no  Eye exam not for a long time    Tobacco User no  Patient is on a daily aspirin  Patient is on a Statin.  Blood pressure today of:     BP Readings from Last 1 Encounters:   08/15/18 128/80      is at the goal of <139/89 for diabetics.    Luci Resendiz LPN on 8/15/2018 at 8:27 AM    Luci Resendiz LPN .......8/15/2018 8:14 AM

## 2018-08-15 NOTE — PROGRESS NOTES
Nursing Notes:   Luci Resendiz LPN  8/15/2018  8:45 AM  Signed  Patient is here for her yearly medical check.    Previous A1C is at goal of <8  No results found for: A1C  Urine microalbumin:creatine: 0.62  Foot exam no - done today  Eye exam not for a long time    Tobacco User no  Patient is on a daily aspirin  Patient is on a Statin.  Blood pressure today of:     BP Readings from Last 1 Encounters:   08/15/18 128/80      is at the goal of <139/89 for diabetics.    Luci Resendiz LPN on 8/15/2018 at 8:27 AM    Luci Resendiz LPN .......8/15/2018 8:14 AM    SUBJECTIVE:    Milena Contreras is a 72 y.o. female who presents for review of medications, chronic conditions and labs.    HPI: Milena Contreras is a 72 y.o. female presents for review of medications, chronic conditions and labs.    Her main concern is her knees.  She has a hard time walking due to her knees.  She knows she would benefit from TKA, but with  having dialysis, she can't do this now.  Would be interested in knee injections.    She was seen last spring with increased lower extremity edema. Workup for this showed her creatinine to be elevated at 1.5. She also underwent echocardiogram, this showed pulmonary hypertension. She had a consultative visit with internal medicine regarding this. She is due today for recheck of her creatinine. Pulmonary hypertension was felt to be related to chronic hypertension, obesity, asthma, possible sleep apnea.      She has type 2 diabetes, hypertension, elevated uric acid levels and hyperlipidemia.  She doesn't exercise regularly due to her knees nor does she check BS often  HEMOGLOBIN A1C MONITORING (POCT)   Date Value   05/08/2017 6.5 % (H)   03/20/2013 9.0 % NGSP (H)       No results found for: CHOL  Lab Results   Component Value Date    HDL 50 05/08/2017     Lab Results   Component Value Date    LDL 46 05/08/2017     Lab Results   Component Value Date    TRIG 117 05/08/2017         Home monitoring:not often   Aspirin:   yes  Statin:  Yes, Zocor   Last eye exam: has been over a year     Immunizations:  Up to date except for zostavax   Mammogram is scheduled for today.    Colon cancer screening 2014      Asthma:  Feels more out of breath a lot.  Hasn't had a big attack since last summer - but this seemed to be more her heart.      LE edema - increasing again recently, similar to a past episode.  Worried about her kidneys -she does not take anti-inflammatories for her knee pain as she is worried about the effects on her kidneys.    FH and PMH reviewed and updated.    PROBLEM LIST:  Patient Active Problem List   Diagnosis Code     HYPERTENSION I10     ASTHMA, PERSISTENT, MODERATE J45.909     PSORIASIS L40.9     OBESITY E66.9     HYPERLIPIDEMIA E78.5     HYPERURICEMIA R79.9     RENAL INSUFFICIENCY N28.9     ABDOMINAL INCISIONAL HERNIA K43.2     DIABETES MELLITUS, TYPE II, UNCONTROLLED E11.65     Bilateral primary osteoarthritis of knee M17.0     Mild pulmonary hypertension (HC) I27.2     PAST MEDICAL HISTORY:  Past Medical History:   Diagnosis Date     Alopecia      Chronic kidney disease 2014    Cr 1.3; +proteinuria     DM2 (diabetes mellitus, type 2) (HC) 2012    DM2 - referred to [facility]      Hyperlipidemia      Hyperuricemia     With gout x3     Obesity      Persistent asthma      SURGICAL HISTORY:  Past Surgical History:   Procedure Laterality Date     COLONOSCOPY SCREENING  01/2004    a few external hemorrhoids     COLONOSCOPY SCREENING  4/3/2014    diverticulosis fu 10 yrs     FRACTURE TREATMENT      Left finger ORIF for fracture     JIMMY AND BSO      JIMMY/BSO for uterine fibroids        SOCIAL HISTORY:  Social History     Social History     Marital status:      Spouse name: Alfredo     Number of children: 2     Years of education: 12     Occupational History      [employer]     Social History Main Topics     Smoking status: Never Smoker     Smokeless tobacco: Never Used     Alcohol use No     Drug use: No      Sexual activity: Yes     Partners: Male     Other Topics Concern     Not on file     Social History Narrative    Spouse Alfredo    Children    1 son, 1 daughter     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   previous had his own [employer] business.  This is her 3rd marriage.  has current disabilities from falling off a chair 11 years ago w/spinal cord bruise.             FAMILY HISTORY:  Family History   Problem Relation Age of Onset     Cancer Mother      Lung      Hypertension Mother      Cancer-breast Mother      Age 80     Stroke Mother       of CVA age 79     Heart Disease Father       of MI age 72     Asthma Daughter      Hypertension Sister      Hypertension     Kidney disease No Family History      Blood Disease No Family History      no blood clots     CURRENT MEDICATIONS:   Current Outpatient Prescriptions   Medication Sig Dispense Refill     Current Outpatient Prescriptions   Medication     albuterol (PROAIR HFA/PROVENTIL HFA/VENTOLIN HFA) 108 (90 Base) MCG/ACT inhaler     allopurinol (ZYLOPRIM) 100 MG tablet     amLODIPine (NORVASC) 10 MG tablet     aspirin 81 MG chewable tablet     blood glucose monitoring (AMANDA CONTOUR) test strip     calcium-vitamin D (CALTRATE) 600-400 MG-UNIT per tablet     carvedilol (COREG) 6.25 MG tablet     fish oil-omega-3 fatty acids 1000 MG capsule     fluticasone-salmeterol (ADVAIR DISKUS) 250-50 MCG/DOSE diskus inhaler     lisinopril-hydrochlorothiazide (PRINZIDE/ZESTORETIC) 20-25 MG per tablet     metFORMIN (GLUCOPHAGE) 1000 MG tablet     simvastatin (ZOCOR) 20 MG tablet     [DISCONTINUED] albuterol (PROAIR HFA/PROVENTIL HFA/VENTOLIN HFA) 108 (90 BASE) MCG/ACT Inhaler     [DISCONTINUED] fluticasone-salmeterol (ADVAIR DISKUS) 250-50 MCG/DOSE diskus inhaler     [DISCONTINUED] lisinopril-hydrochlorothiazide (PRINZIDE/ZESTORETIC) 20-25 MG per tablet     No current facility-administered medications for this visit.        No current  "facility-administered medications for this visit.      Medications have been reviewed by me and are current to the best of my knowledge and ability.    ALLERGIES:  Cats (fur, dander, saliva)     REVIEW OF SYSTEMS:  General: denies any general problems.  Eyes: glasses - past due for recheck  - hasn't been in for a few yars  Ears/Nose/Throat: postnasal drip   Cardiovascular: known hypertension, pulmonary hypertension   Respiratory: asthma her whole adult life; up several times a night, naps during the day  - some unintentional  Gastrointestinal: known hernia; not painful.  She had put off any repair of this due to need to take care of her     Genitourinary: denies problems  Musculoskeletal: LE joint pain - especially her knees  Skin: psoriasis history   Neurologic: denies problems  Psychiatric: denies problems  Endocrine: see above  Heme/Lymphatic: denies problems  Allergic/Immunologic: Animal fur  PHQ-2 Score:     PHQ-2 ( 1999 [employer]) 8/15/2018   Q1: Little interest or pleasure in doing things 0   Q2: Feeling down, depressed or hopeless 0   PHQ-2 Score 0       OBJECTIVE:  /80 (BP Location: Right arm, Patient Position: Sitting, Cuff Size: Adult Large)  Pulse 68  Ht 5' 3\" (1.6 m)  Wt 224 lb (101.6 kg)  Breastfeeding? No  BMI 39.68 kg/m2   EXAM:   General Appearance: Pleasant, alert, appropriate appearance for age. No acute distress  Head Exam: Normal. Normocephalic, atraumatic.  Eye Exam:  Normal external eye, conjunctiva, lids, cornea. YURIDIA.  Ear Exam: Normal TM's bilaterally. Normal auditory canals and external ears. Non-tender.   Nose Exam: Normal external nose, mucus membranes, and septum.  OroPharynx Exam: Some injured teeth  Neck Exam:  Supple, no masses or nodes.  Thyroid Exam: No nodules or enlargement.  Chest/Respiratory Exam: Normal chest wall and respirations. Clear to auscultation.  Breast Exam: No dimpling, nipple retraction or discharge. No masses or nodes.  Cardiovascular Exam: Regular " rate and rhythm. S1, S2, no murmur, click, gallop, or rubs.  Gastrointestinal Exam: moderate size ventral/umbilical hernia    Musculoskeletal Exam: bilateral knee pain; 2+ LE edema; toenails thickened, sensation is intact  Skin: no rash or abnormalities  Neurologic Exam: Gait is affected by her knees  Psychiatric Exam: Alert and oriented - appropriate affect.      ASSESSMENT/PLAN    ICD-10-CM        ICD-10-CM    1. Mild asthma without complication, unspecified whether persistent J45.909 albuterol (PROAIR HFA/PROVENTIL HFA/VENTOLIN HFA) 108 (90 Base) MCG/ACT inhaler     fluticasone-salmeterol (ADVAIR DISKUS) 250-50 MCG/DOSE diskus inhaler   2. Mild pulmonary hypertension I27.20 lisinopril-hydrochlorothiazide (PRINZIDE/ZESTORETIC) 20-25 MG per tablet     Comprehensive Metabolic Panel     CBC W PLT No Diff     Lipid Panel     Hemoglobin A1c     Urinalysis w Reflex Microscopic If Positive     Albumin Random Urine Quantitative with Creat Ratio   3. Essential hypertension I10 lisinopril-hydrochlorothiazide (PRINZIDE/ZESTORETIC) 20-25 MG per tablet     Comprehensive Metabolic Panel     CBC W PLT No Diff     Lipid Panel     Hemoglobin A1c     Urinalysis w Reflex Microscopic If Positive     Albumin Random Urine Quantitative with Creat Ratio   4. Primary osteoarthritis of both knees M17.0 XR Knee Left 3 Views     XR Knee Right 3 Views     ORTHOPEDICS ADULT REFERRAL   5. Renal insufficiency syndrome N28.9 Comprehensive Metabolic Panel     CBC W PLT No Diff     Lipid Panel     Hemoglobin A1c     Urinalysis w Reflex Microscopic If Positive     Albumin Random Urine Quantitative with Creat Ratio   6. Encounter for screening mammogram for breast cancer Z12.31 MA Screen Bilateral w/Abrahan   7. Uncontrolled type 2 diabetes mellitus without complication, without long-term current use of insulin (H) E11.65 Comprehensive Metabolic Panel     CBC W PLT No Diff     Lipid Panel     Hemoglobin A1c     Urinalysis w Reflex Microscopic If  Positive     Albumin Random Urine Quantitative with Creat Ratio   8. Incisional hernia, without obstruction or gangrene K43.2    9. Hyperlipidemia, unspecified hyperlipidemia type E78.5 TSH         1.  Mammogram ordered  2.. Labs due today include her diabetic labs, metabolic panel, lipids, TSH.  3.  Anticipate her A1c again being elevated, review [place] options with her following this.  4.  Eye and dentist visits recommended.  5.  BP is at goal.  6.  Continue statin therapy.  7.  Recommend sleep study for additional evaluation of pulmonary hypertension - pt not ready to act on this at this time.  8.  Continue same hypertension medications.  9.  Flu vaccine this fall  10. Continue same asthma medications.  11.  X-rays of her knees to be obtained today with follow-up visit with orthopedics to discuss corticosteroid injections.  As noted above, she realizes that knee replacement would be most definitive treatment for her symptoms.    An De La Rosa MD

## 2018-08-15 NOTE — MR AVS SNAPSHOT
After Visit Summary   8/15/2018    Milena Contreras    MRN: [id number]           Patient Information     Date Of Birth          1945        Visit Information        Provider Department      8/15/2018 8:15 AM An Alves MD [facility]        Today's Diagnoses     Mild asthma without complication, unspecified whether persistent    -  1    Mild pulmonary hypertension        Essential hypertension        Primary osteoarthritis of both knees        Renal insufficiency syndrome        Encounter for screening mammogram for breast cancer        Uncontrolled type 2 diabetes mellitus without complication, without long-term current use of insulin (H)        Incisional hernia, without obstruction or gangrene        Hyperlipidemia, unspecified hyperlipidemia type           Follow-ups after your visit        Additional Services     ORTHOPEDICS ADULT REFERRAL       Your provider has referred you to: PREFERRED PROVIDERS:    Please be aware that coverage of these services is subject to the terms and limitations of your health insurance plan.  Call member services at your health plan with any benefit or coverage questions.      Please bring the following to your appointment:    >>   Any x-rays, CTs or MRIs which have been performed.  Contact the facility where they were done to arrange for  prior to your scheduled appointment.    >>   List of current medications   >>   This referral request   >>   Any documents/labs given to you for this referral                  Future tests that were ordered for you today     Open Future Orders        Priority Expected Expires Ordered    TSH Routine  8/15/2019 8/15/2018    Comprehensive Metabolic Panel Routine  8/15/2019 8/15/2018    CBC W PLT No Diff Routine  8/15/2019 8/15/2018    Lipid Panel Routine  8/15/2019 8/15/2018    Hemoglobin A1c Routine  8/15/2019 8/15/2018    Albumin Random Urine Quantitative with Creat Ratio Routine   "2019 8/15/2018    MA Screen Bilateral w/Abrahan Routine  8/15/2019 8/15/2018    XR Knee Left 3 Views Routine 8/15/2018 8/15/2019 8/15/2018    XR Knee Right 3 Views Routine 8/15/2018 8/15/2019 8/15/2018            Who to contact     If you have questions or need follow up information about today's clinic visit or your schedule please contact [facility] directly at [phone].  Normal or non-critical lab and imaging results will be communicated to you by Intaccthart, letter or phone within 4 business days after the clinic has received the results. If you do not hear from us within 7 days, please contact the clinic through Intaccthart or phone. If you have a critical or abnormal lab result, we will notify you by phone as soon as possible.  Submit refill requests through [employer] or call your pharmacy and they will forward the refill request to us. Please allow 3 business days for your refill to be completed.          Additional Information About Your Visit        IntacctharSite [employer] Information     [employer] lets you send messages to your doctor, view your test results, renew your prescriptions, schedule appointments and more. To sign up, go to [email or web address] . Click on \"Log in\" on the left side of the screen, which will take you to the Welcome page. Then click on \"Sign up Now\" on the right side of the page.     You will be asked to enter the access code listed below, as well as some personal information. Please follow the directions to create your username and password.     Your access code is: [email or web address]  Expires: 2018  9:05 AM     Your access code will  in 90 days. If you need help or a new code, please call your [place] clinic or [phone].        Care EveryWhere ID     This is your Care EveryWhere ID. This could be used by other organizations to access your [place] medical records  [id number]        Your Vitals Were     Pulse Height Breastfeeding? BMI (Body Mass Index)          68 " "5' 3\" (1.6 m) No 39.68 kg/m2         Blood Pressure from Last 3 Encounters:   08/15/18 128/80   06/15/17 152/82   05/08/17 130/80    Weight from Last 3 Encounters:   08/15/18 224 lb (101.6 kg)   06/15/17 235 lb (106.6 kg)   05/08/17 240 lb 9.6 oz (109.1 kg)              We Performed the Following     ORTHOPEDICS ADULT REFERRAL     Urinalysis w Reflex Microscopic If Positive          Where to get your medicines      These medications were sent to [facility] - [place], [place] - [address]  [address], [facility] [address]     Phone:  [phone]     albuterol 108 (90 Base) MCG/ACT inhaler    fluticasone-salmeterol 250-50 MCG/DOSE diskus inhaler    lisinopril-hydrochlorothiazide 20-25 MG per tablet          Primary Care Provider Office Phone # Fax #    An TRACY Zaldivar [phone] [address] [facility] [address]        Equal Access to Services     [facility]: Hadii anthony elliott Soалександр, waaxda lutri, qaybta kaalvanessa mckay, morales crawford . So [facility] [phone].    ATENCIÓN: Si habla español, tiene a de la o disposición servicios gratuitos de asistencia lingüística. [facility] [phone].    We comply with applicable federal civil rights laws and [place] laws. We do not discriminate on the basis of race, color, national origin, age, disability, sex, sexual orientation, or gender identity.            Thank you!     Thank you for choosing [facility]  for your care. Our goal is always to provide you with excellent care. Hearing back from our patients is one way we can continue to improve our services. Please take a few minutes to complete the written survey that you may receive in the mail after your visit with us. Thank you!             Your Updated Medication List - Protect others around you: Learn how to safely use, store and throw away your medicines at www.disposemymeds.org.          This list " is accurate as of 8/15/18  9:05 AM.  Always use your most recent med list.                   Brand Name Dispense Instructions for use Diagnosis    albuterol 108 (90 Base) MCG/ACT inhaler    PROAIR HFA/PROVENTIL HFA/VENTOLIN HFA    2 Inhaler    Inhale 2 puffs into the lungs every 4 hours as needed    Mild asthma without complication, unspecified whether persistent       allopurinol 100 MG tablet    ZYLOPRIM    90 tablet    TAKE ONE TABLET ORALLY ONCE A DAY    Abnormal blood chemistry       amLODIPine 10 MG tablet    NORVASC    90 tablet    Take 1 tablet (10 mg) by mouth daily    Essential hypertension       aspirin 81 MG chewable tablet      Take 81 mg by mouth daily with food        [employer] CONTOUR test strip   Generic drug:  blood glucose monitoring      Testing twice daily E11.65        calcium-vitamin D 600-400 MG-UNIT per tablet    CALTRATE     Take 1 tablet by mouth 2 times daily        carvedilol 6.25 MG tablet    COREG    180 tablet    TAKE ONE TABLET ORALLY TWO TIMES A DAY WITH FOOD    Essential hypertension       fish oil-omega-3 fatty acids 1000 MG capsule      Take 1 capsule by mouth daily        fluticasone-salmeterol 250-50 MCG/DOSE diskus inhaler    ADVAIR DISKUS    3 Inhaler    Inhale 1 puff into the lungs 2 times daily    Mild asthma without complication, unspecified whether persistent       lisinopril-hydrochlorothiazide 20-25 MG per tablet    PRINZIDE/ZESTORETIC    90 tablet    TAKE ONE TABLET ORALLY ONCE A DAY    Mild pulmonary hypertension, Essential hypertension       metFORMIN 1000 MG tablet    GLUCOPHAGE    90 tablet    Take 1 tablet (1,000 mg) by mouth daily (with breakfast)    Uncontrolled type 2 diabetes mellitus with complication, without long-term current use of insulin (H)       simvastatin 20 MG tablet    ZOCOR    90 tablet    Take 1 tablet (20 mg) by mouth At Bedtime    Hyperlipidemia, unspecified hyperlipidemia type

## 2018-08-15 NOTE — LETTER
August 15, 2018      Milena Contreras  [address] [facility] [address]        Dear Milena,     Here is a copy of your recent labs:    Results for orders placed or performed in visit on 08/15/18   Urinalysis w Reflex Microscopic If Positive   Result Value Ref Range    Color Urine Yellow     Appearance Urine Clear     Glucose Urine Negative NEG^Negative mg/dL    Bilirubin Urine Negative NEG^Negative    Ketones Urine Negative NEG^Negative mg/dL    Specific Gravity Urine 1.020 1.000 - 1.030    Blood Urine Trace (A) NEG^Negative    pH Urine 6.0 5.0 - 9.0 pH    Protein Albumin Urine 100 (A) NEG^Negative mg/dL    Urobilinogen Urine 0.2 0.2 - 1.0 EU/dL    Nitrite Urine Negative NEG^Negative    Leukocyte Esterase Urine Negative NEG^Negative    Source Midstream Urine    Comprehensive Metabolic Panel   Result Value Ref Range    Sodium 141 134 - 144 mmol/L    Potassium 4.3 3.5 - 5.1 mmol/L    Chloride 101 98 - 107 mmol/L    Carbon Dioxide 32 (H) 21 - 31 mmol/L    Anion Gap 8 3 - 14 mmol/L    Glucose 145 (H) 70 - 105 mg/dL    Urea Nitrogen 28 (H) 7 - 25 mg/dL    Creatinine 1.49 (H) 0.60 - 1.20 mg/dL    GFR Estimate 34 (L) >60 mL/min/1.7m2    GFR Estimate If Black 42 (L) >60 mL/min/1.7m2    Calcium 9.9 8.6 - 10.3 mg/dL    Bilirubin Total 0.6 0.3 - 1.0 mg/dL    Albumin 4.1 3.5 - 5.7 g/dL    Protein Total 7.2 6.4 - 8.9 g/dL    Alkaline Phosphatase 64 34 - 104 U/L    ALT 13 7 - 52 U/L    AST 14 13 - 39 U/L   CBC W PLT No Diff   Result Value Ref Range    WBC 10.6 4.0 - 11.0 10e9/L    RBC Count 4.46 3.8 - 5.2 10e12/L    Hemoglobin 12.7 11.7 - 15.7 g/dL    Hematocrit 39.5 35.0 - 47.0 %    MCV 89 78 - 100 fl    MCH 28.5 26.5 - 33.0 pg    MCHC 32.2 31.5 - 36.5 g/dL    RDW 15.0 10.0 - 15.0 %    Platelet Count 243 150 - 450 10e9/L   Lipid Panel   Result Value Ref Range    Cholesterol 160 <200 mg/dL    Triglycerides 111 <150 mg/dL    HDL Cholesterol 54 23 - 92 mg/dL    LDL Cholesterol Calculated 84 <100 mg/dL    Non HDL  Cholesterol 106 <130 mg/dL   Hemoglobin A1c   Result Value Ref Range    Hemoglobin A1C 6.9 (H) 4.0 - 6.0 %   TSH   Result Value Ref Range    Thyrotropin 0.96 0.34 - 5.60 IU/mL   Urine Microscopic   Result Value Ref Range    WBC Urine 0 - 5 OTO5^0 - 5 /HPF    RBC Urine O - 2 OTO2^O - 2 /HPF         Your hemoglobin A1c, check of how your blood sugars are, has stayed fairly stable over the past year.  Continue with the same dose of metformin.  Your cholesterol levels are normal - continue our same dose of simvastatin.    Your kidney function is stable from last year.  This should be checked every 4-6 months or so.      Sincerely,    ALEXEY NUNES MD

## 2018-08-29 ENCOUNTER — HOSPITAL ENCOUNTER (OUTPATIENT)
Dept: MAMMOGRAPHY | Facility: OTHER | Age: 73
Discharge: HOME | End: 2018-08-29
Attending: FAMILY MEDICINE | Admitting: FAMILY MEDICINE
Payer: MEDICARE

## 2018-08-29 DIAGNOSIS — Z12.31 ENCOUNTER FOR SCREENING MAMMOGRAM FOR BREAST CANCER: ICD-10-CM

## 2018-08-29 PROCEDURE — 77067 SCR MAMMO BI INCL CAD: CPT

## 2018-10-12 ENCOUNTER — OFFICE VISIT (OUTPATIENT)
Dept: ORTHOPEDICS | Facility: OTHER | Age: 73
End: 2018-10-12
Attending: FAMILY MEDICINE
Payer: MEDICARE

## 2018-10-12 DIAGNOSIS — M17.0 PRIMARY OSTEOARTHRITIS OF BOTH KNEES: ICD-10-CM

## 2018-10-12 PROCEDURE — 20610 DRAIN/INJ JOINT/BURSA W/O US: CPT | Mod: 50

## 2018-10-12 PROCEDURE — G0463 HOSPITAL OUTPT CLINIC VISIT: HCPCS

## 2018-10-12 NOTE — MR AVS SNAPSHOT
"              After Visit Summary   10/12/2018    Milena Contreras    MRN: [id number]           Patient Information     Date Of Birth          1945        Visit Information        Provider Department      10/12/2018 11:00 AM Kike Landaverde MD [facility]        Today's Diagnoses     Primary osteoarthritis of both knees           Follow-ups after your visit        Who to contact     If you have questions or need follow up information about today's clinic visit or your schedule please contact [facility] directly at [phone].  Normal or non-critical lab and imaging results will be communicated to you by Lumatehart, letter or phone within 4 business days after the clinic has received the results. If you do not hear from us within 7 days, please contact the clinic through [employer] or phone. If you have a critical or abnormal lab result, we will notify you by phone as soon as possible.  Submit refill requests through [employer] or call your pharmacy and they will forward the refill request to us. Please allow 3 business days for your refill to be completed.          Additional Information About Your Visit        MyChart Information     [employer] lets you send messages to your doctor, view your test results, renew your prescriptions, schedule appointments and more. To sign up, go to [email or web address] . Click on \"Log in\" on the left side of the screen, which will take you to the Welcome page. Then click on \"Sign up Now\" on the right side of the page.     You will be asked to enter the access code listed below, as well as some personal information. Please follow the directions to create your username and password.     Your access code is: [email or web address]  Expires: 2019  8:41 AM     Your access code will  in 90 days. If you need help or a new code, please call your [facility] or [phone].        Care EveryWhere ID     This is your Care EveryWhere ID. " This could be used by other organizations to access your [facility] medical records  [id number]         Blood Pressure from Last 3 Encounters:   08/15/18 128/80   06/15/17 152/82   05/08/17 130/80    Weight from Last 3 Encounters:   08/15/18 101.6 kg (224 lb)   06/15/17 106.6 kg (235 lb)   05/08/17 109.1 kg (240 lb 9.6 oz)              Today, you had the following     No orders found for display       Primary Care Provider Office Phone # Fax #    An MOLINA Lonnie Zaldivar [phone]       [address] [facility] [address]        Equal Access to Services     [facility] : Hadii anthony Abbott, wabradleyda zoe, qaybta kaalmada nely, morales crawford . So [facility] [phone].    ATENCIÓN: Si habla español, tiene a de la o disposición servicios gratuitos de asistencia lingüística. Llame al [phone].    We comply with applicable federal civil rights laws and [place] laws. We do not discriminate on the basis of race, color, national origin, age, disability, sex, sexual orientation, or gender identity.            Thank you!     Thank you for choosing [facility]  for your care. Our goal is always to provide you with excellent care. Hearing back from our patients is one way we can continue to improve our services. Please take a few minutes to complete the written survey that you may receive in the mail after your visit with us. Thank you!             Your Updated Medication List - Protect others around you: Learn how to safely use, store and throw away your medicines at www.disposemymeds.org.          This list is accurate as of 10/12/18 11:59 PM.  Always use your most recent med list.                   Brand Name Dispense Instructions for use Diagnosis    albuterol 108 (90 Base) MCG/ACT inhaler    PROAIR HFA/PROVENTIL HFA/VENTOLIN HFA    2 Inhaler    Inhale 2 puffs into the lungs every 4 hours as needed    Mild asthma without  complication, unspecified whether persistent       allopurinol 100 MG tablet    ZYLOPRIM    90 tablet    TAKE ONE TABLET ORALLY ONCE A DAY    Abnormal blood chemistry       amLODIPine 10 MG tablet    NORVASC    90 tablet    Take 1 tablet (10 mg) by mouth daily    Essential hypertension       aspirin 81 MG chewable tablet      Take 81 mg by mouth daily with food        AMANDA CONTOUR test strip   Generic drug:  blood glucose monitoring      Testing twice daily E11.65        calcium carbonate 600 mg-vitamin D 400 units 600-400 MG-UNIT per tablet    CALTRATE     Take 1 tablet by mouth 2 times daily        carvedilol 6.25 MG tablet    COREG    180 tablet    TAKE ONE TABLET ORALLY TWO TIMES A DAY WITH FOOD    Essential hypertension       fish oil-omega-3 fatty acids 1000 MG capsule      Take 1 capsule by mouth daily        fluticasone-salmeterol 250-50 MCG/DOSE diskus inhaler    ADVAIR DISKUS    3 Inhaler    Inhale 1 puff into the lungs 2 times daily    Mild asthma without complication, unspecified whether persistent       lisinopril-hydrochlorothiazide 20-25 MG per tablet    PRINZIDE/ZESTORETIC    90 tablet    TAKE ONE TABLET ORALLY ONCE A DAY    Mild pulmonary hypertension (H), Essential hypertension       metFORMIN 1000 MG tablet    GLUCOPHAGE    90 tablet    Take 1 tablet (1,000 mg) by mouth daily (with breakfast)    Uncontrolled type 2 diabetes mellitus with complication, without long-term current use of insulin (H)       simvastatin 20 MG tablet    ZOCOR    90 tablet    Take 1 tablet (20 mg) by mouth At Bedtime    Hyperlipidemia, unspecified hyperlipidemia type

## 2018-10-18 NOTE — PROGRESS NOTES
"VITALS:   Height:   63  Weight:   224  Pulse rate:  68  Blood Pressure:  128 / 60      CHIEF COMPLAINT: Bilateral Knee Pain    PROBLEMS:   Patient has no noted problems.    PATIENT REPORTED MEDICATIONS:   Patient has no noted medications.    PATIENT REPORTED ALLERGIES:  * BEER (SevereReaction: Unknown Reaction)  * CAT DANDER (SevereReaction: Unknown Reaction)    Allergies were reviewed during this visit.    RISK FACTORS:  Tobacco use:   Never smoker  Alcohol Use:   No    HISTORY OF PRESENT ILLNESS:    Milena is a 72-year-old female.   She and her  own a resort.   I am seeing her today for evaluation of bilateral knee pain, right substantially greater than left.   She has had problems with her knees for several years.   She has difficulty standing and walking.   She has had a few episodes where she has almost fallen.  She has a  who she provides care for.        PMH:   Health history form dated 10/12/18 is reviewed and signed.  Past medical history, surgical history, social history, family history, medications, and review of systems is noted and scanned into the chart.       PAST MEDICAL HISTORY:    Diabetes  High Blood Pressure  Heart Trouble  Arthritis    PAST ORTHOPEDIC SURGICAL HISTORY:    None    PAST SURGICAL HISTORY:    None    FAMILY HISTORY:    Father:  Heart Issue  Mother:  Cancer    SOCIAL HISTORY:   Marital Status:    Occupation:  Resort Owner  Alcohol Use:  No  Tobacco Use:  Never  Secondhand Smoke Exposure:  Yes  History HIV:  No  History Hepatitis:   No    REVIEW OF SYSTEMS:  Joint or Muscle pain: Yes  Swelling:  Yes  Difficulty in walking: Yes  Weakness of muscles: Yes    PHYSICAL EXAMINATION:    General:    Physical examination reveals a 72-year-old female.  Height: 5' 3\",  Weight: 224 pounds,  Pulse: 68,  BP: 128/60.  The patient is pleasant, alert, oriented x3, appropriate, in no acute distress.    The patient's gait and station are appropriate.  The patient is well groomed, " well kempt.  Skin is healthy and intact with no erythema, warmth, rashes, or bruising.   Normal capillary refill.  Pulses are palpable.  Motor is five out of five in all muscle groups tested.   Sensory exam is intact.    Musculoskeletal:        Examination while standing shows severe varus deformities bilaterally.    She has flexion contractures, pulses are intact.   Range of motion is perhaps -5  to about 110 .   Collateral ligaments are stable.    Orders:   SNOMED-CT: [id number] Patient Encounter for Meaningful Use [[id number]]    X-RAY:  AP and lateral views of both knees as well as bilateral merchant views reveal severe degenerative arthrosis end stage with grade IV changes with severe varus deformities right greater than left.    ASSESSMENT:    End Stage Arthrosis Bilateral Knees    PROCEDURES:   Risks and benefits of the procedure were reviewed with the patient. Informed consent was obtained. Blood sugar risks for our diabetic patients were also discussed.    A sterile prep was performed and each knee was injected with 40 mg Kenalog and 1 cc of 0.5% Marcaine without epinephrine.    There were no complications.    PLAN:   End Stage Arthrosis Bilateral Knees:  We discussed total knee arthroplasty.   Risks, complications, and benefits were reviewed and this can be scheduled at the patient's convenience.     Currently her  is requiring care at home and so she would like to postpone this.   In the interim she would like to proceed with corticosteroid injection.    We will see her back as symptoms warrant, but ultimately she will go on to total knee arthroplasty I suspect.     Dictated by Kike Landaverde M.D.  D:  10/12/18  T:   10/17/18    Typed and/or reviewed and corrected by signing  below, and sent to the Physician for final review and signature.     This report was created using voice recording software and computer-generated templates. Although every effort has been made to  review for and eliminate errors, some errors may still occur.     Electronically signed by Xochitl Sen  on 10/17/2018 at 3:56 PM    Electronically signed by Kike Landaverde MD on 10/18/2018 at 8:29 AM  ________________________________________________________________________

## 2019-01-07 NOTE — TELEPHONE ENCOUNTER
"Requested Prescriptions   Pending Prescriptions Disp Refills     blood glucose (NO BRAND SPECIFIED) test strip 200 strip 1     Sig: Dispense item as covered by patient's insurance. Use as directed to test blood sugar twice daily E11.65.    Diabetic Supplies Protocol Passed - 1/7/2019  3:42 PM       Passed - Medication is active on med list       Passed - Patient is 18 years of age or older       Passed - Recent (6 mo) or future (30 days) visit within the authorizing provider's specialty    Patient had office visit in the last 6 months or has a visit in the next 30 days with authorizing provider.  See \"Patient Info\" tab in inbasket, or \"Choose Columns\" in Meds & Orders section of the refill encounter.              LOV-08/15/2018    Prescription refilled per RN Medication RefillPolicy.................... Madeline Ruano ....................  1/7/2019   3:43 PM            "

## 2019-04-03 ENCOUNTER — TRANSFERRED RECORDS (OUTPATIENT)
Dept: HEALTH INFORMATION MANAGEMENT | Facility: OTHER | Age: 74
End: 2019-04-03

## 2019-04-03 LAB — EJECTION FRACTION: 26 %

## 2019-04-04 ENCOUNTER — TRANSFERRED RECORDS (OUTPATIENT)
Dept: HEALTH INFORMATION MANAGEMENT | Facility: OTHER | Age: 74
End: 2019-04-04

## 2019-04-07 ENCOUNTER — TRANSFERRED RECORDS (OUTPATIENT)
Dept: HEALTH INFORMATION MANAGEMENT | Facility: OTHER | Age: 74
End: 2019-04-07

## 2019-04-08 ENCOUNTER — TRANSFERRED RECORDS (OUTPATIENT)
Dept: HEALTH INFORMATION MANAGEMENT | Facility: OTHER | Age: 74
End: 2019-04-08

## 2019-04-08 PROBLEM — I31.39 PERICARDIAL EFFUSION: Status: ACTIVE | Noted: 2019-04-03

## 2019-04-08 PROBLEM — R94.31 PROLONGED QT INTERVAL: Status: ACTIVE | Noted: 2019-04-03

## 2019-04-08 PROBLEM — I50.41 ACUTE COMBINED SYSTOLIC AND DIASTOLIC HEART FAILURE (H): Status: ACTIVE | Noted: 2019-04-03

## 2019-04-08 LAB — EJECTION FRACTION: 25 %

## 2019-04-17 ENCOUNTER — TRANSFERRED RECORDS (OUTPATIENT)
Dept: HEALTH INFORMATION MANAGEMENT | Facility: OTHER | Age: 74
End: 2019-04-17

## 2019-04-29 ENCOUNTER — TRANSFERRED RECORDS (OUTPATIENT)
Dept: HEALTH INFORMATION MANAGEMENT | Facility: OTHER | Age: 74
End: 2019-04-29

## 2019-04-30 DIAGNOSIS — I10 ESSENTIAL HYPERTENSION: ICD-10-CM

## 2019-05-02 RX ORDER — CARVEDILOL 6.25 MG/1
TABLET ORAL
Qty: 180 TABLET | Refills: 3 | OUTPATIENT
Start: 2019-05-02

## 2019-06-18 DIAGNOSIS — J45.909 MILD ASTHMA WITHOUT COMPLICATION, UNSPECIFIED WHETHER PERSISTENT: ICD-10-CM

## 2019-06-18 NOTE — TELEPHONE ENCOUNTER
Routing refill request to provider for review/approval because:  ACT not current    Advair filled 8-15-18 for #3 inhaler X 1 refill.  LOV 8-15-18.  Dina Adames RN on 6/18/2019 at 9:59 AM

## 2019-07-29 DIAGNOSIS — I10 ESSENTIAL HYPERTENSION: ICD-10-CM

## 2019-07-29 NOTE — LETTER
August 1, 2019      Milena Contreras  [address]  [facility] [address]        Dear Milena,     This is to remind you that you are coming due for your annual office visit with ALEXEY NUNES MD .  Your last visit was on 08/15/2018.     Additional refills of your medication require you to complete this visit.    Please call [phone] to schedule your appointment.    Thank you for choosing [facility] and [facility] for your health care needs.    Sincerely,      Refill RN  [facility]

## 2019-08-01 RX ORDER — CARVEDILOL 6.25 MG/1
TABLET ORAL
Qty: 180 TABLET | Refills: 0 | Status: SHIPPED | OUTPATIENT
Start: 2019-08-01

## 2019-08-01 NOTE — TELEPHONE ENCOUNTER
"Requested Prescriptions   Pending Prescriptions Disp Refills     carvedilol (COREG) 6.25 MG tablet [Pharmacy Med Name: CARVEDILOL 6.25 MG TAB 6.25 TAB] 180 tablet 3     Sig: TAKE ONE TABLET ORALLY TWO TIMES A DAY WITH FOOD       Beta-Blockers Protocol Passed - 7/29/2019  9:21 AM        Passed - Blood pressure under 140/90 in past 12 months     BP Readings from Last 3 Encounters:   08/15/18 128/80   06/15/17 152/82   05/08/17 130/80                 Passed - Patient is age 6 or older        Passed - Recent (12 mo) or future (30 days) visit within the authorizing provider's specialty     Patient had office visit in the last 12 months or has a visit in the next 30 days with authorizing provider or within the authorizing provider's specialty.  See \"Patient Info\" tab in inbasket, or \"Choose Columns\" in Meds & Orders section of the refill encounter.              Passed - Medication is active on med list        LOV-08/15/2018    Due for exam.  Limited refill per protocol and letter mailed.  Madeline Ruano ........   8/1/2019    10:28 AM      "

## 2019-08-07 NOTE — TELEPHONE ENCOUNTER
Filled 08/01/18 #180 x 3. Pharmacy alerted. Unable to complete prescription refill per RNMedication Refill Policy.................... Madeline Ruano ....................  5/2/2019   2:51 PM    carvedilol (COREG) 6.25 MG tablet 180 tablet 3 8/1/2018  No   Sig: TAKE ONE TABLET ORALLY TWO TIMES A DAY WITH FOOD   Sent to pharmacy as: carvedilol (COREG) 6.25 MG tablet   Class: E-Prescribe   Order: [id number]   E-Prescribing Status: Receipt confirmed by pharmacy (8/1/2018  2:39 PM CDT)   Printout Tracking     External Result Report   Pharmacy     [facility] - [place], [place] - [facility] [employer]        07-Aug-2019 06:34

## 2019-08-30 ENCOUNTER — TRANSFERRED RECORDS (OUTPATIENT)
Dept: HEALTH INFORMATION MANAGEMENT | Facility: OTHER | Age: 74
End: 2019-08-30

## 2019-08-30 LAB — EJECTION FRACTION: NORMAL %

## 2019-09-03 LAB — EJECTION FRACTION: NORMAL %

## 2019-09-12 ENCOUNTER — TRANSFERRED RECORDS (OUTPATIENT)
Dept: HEALTH INFORMATION MANAGEMENT | Facility: OTHER | Age: 74
End: 2019-09-12

## 2019-09-13 ENCOUNTER — TRANSFERRED RECORDS (OUTPATIENT)
Dept: HEALTH INFORMATION MANAGEMENT | Facility: OTHER | Age: 74
End: 2019-09-13

## 2019-11-20 ENCOUNTER — TRANSFERRED RECORDS (OUTPATIENT)
Dept: HEALTH INFORMATION MANAGEMENT | Facility: OTHER | Age: 74
End: 2019-11-20

## 2019-11-20 LAB
CREAT SERPL-MCNC: 1.17 MG/DL (ref 0.4–1)
GFR SERPL CREATININE-BSD FRML MDRD: 45 ML/MIN/1.73M*2
GLUCOSE SERPL-MCNC: 103 MG/DL (ref 70–99)
POTASSIUM SERPL-SCNC: 4 MEQ/L (ref 3.4–5.1)

## 2019-12-04 ENCOUNTER — TRANSFERRED RECORDS (OUTPATIENT)
Dept: HEALTH INFORMATION MANAGEMENT | Facility: OTHER | Age: 74
End: 2019-12-04

## 2019-12-04 LAB — EJECTION FRACTION: NORMAL %

## 2019-12-06 ENCOUNTER — TRANSFERRED RECORDS (OUTPATIENT)
Dept: HEALTH INFORMATION MANAGEMENT | Facility: OTHER | Age: 74
End: 2019-12-06

## 2019-12-23 ENCOUNTER — TELEPHONE (OUTPATIENT)
Dept: SURGERY | Facility: OTHER | Age: 74
End: 2019-12-23

## 2019-12-23 NOTE — TELEPHONE ENCOUNTER
Patient referred by [facility] for a Upper GI endoscopy and screening colonoscopy .   Notes have been sent for [facility] .  Please advise.  Thank you. Asuncion Weber on 12/23/2019 at 8:41 AM

## 2019-12-23 NOTE — TELEPHONE ENCOUNTER
Patient can not have EGD/colonoscopy until after April 2020 as had coronary stents placed 4/9/19. If she needs it sooner should be done in [place] with GI and Cardiology available.

## 2020-01-09 DIAGNOSIS — J45.909 MILD ASTHMA WITHOUT COMPLICATION, UNSPECIFIED WHETHER PERSISTENT: ICD-10-CM

## 2020-01-09 NOTE — TELEPHONE ENCOUNTER
[facility] sent Rx request for the following:      Fluticasone-salmeterol (ADVAIR DISKUS) 250-50 MCG/DOSE inhaler    Sig: Inhale 1 puff into the lungs 2 times daily  Last Prescription Date:   6/19/19  Last Fill Qty/Refills:         3 Inhaler, R-0  ([facility])  Notes to Pharmacy: Patient is due for follow-up prior to additional refills. Thank you.  Last Office Visit:              8/15/18  Future Office visit:           None.  Inhaled Steroids Protocol Failed1/9 11:05 AM   Asthma control assessment score within normal limits in last 6 months    Recent (6 mo) or future (30 days) visit within the authorizing provider's specialty     Per last refill, note sent to pharmacy by Dr. George, stating Pt due for f/u prior to additional refills. Pt railed to schedule appointment, has no upcoming appointments, and appears to have seen family practice provider Ellen Valerio at [facility] in [place]. Refill request refused, with note to pharmacy. Unable to complete prescription refill per RN Medication Refill Policy. Dina Mejia RN .............. 1/9/2020  11:15 AM

## 2020-01-16 DIAGNOSIS — J45.909 MILD ASTHMA WITHOUT COMPLICATION, UNSPECIFIED WHETHER PERSISTENT: ICD-10-CM

## 2020-01-17 NOTE — TELEPHONE ENCOUNTER
"Requested Prescriptions   Pending Prescriptions Disp Refills     fluticasone-salmeterol (ADVAIR) 250-50 MCG/DOSE inhaler [Pharmacy Med Name: Fluticasone-Salmeterol 250-50 MCG/DOSE Inhalation Aerosol Powder Breath Activated (Advair Diskus)]  0     Sig: Inhale 1 Puff into the lungs two times a day. Rinse mouth after use.       Inhaled Steroids Protocol Failed - 1/16/2020  3:26 PM        Failed - Asthma control assessment score within normal limits in last 6 months     Please review ACT score.           Failed - Recent (6 mo) or future (30 days) visit within the authorizing provider's specialty     Patient had office visit in the last 6 months or has a visit in the next 30 days with authorizing provider or within the authorizing provider's specialty.  See \"Patient Info\" tab in inbasket, or \"Choose Columns\" in Meds & Orders section of the refill encounter.            Passed - Patient is age 12 or older        Passed - Medication is active on med list          Last Written Prescription Date:  06/19/2019  Last Fill Quantity: 3 inhalers,   # refills: 0  Last Office Visit: 08/15/2018 with An De La Rosa MD  Future Office visit:       Unable to complete prescription refill per RN medication refill policy. Will route to provider for review and consideration.  Leola Jiménez RN on 1/17/2020 at 11:50 AM      "

## 2020-01-29 ENCOUNTER — TRANSFERRED RECORDS (OUTPATIENT)
Dept: HEALTH INFORMATION MANAGEMENT | Facility: OTHER | Age: 75
End: 2020-01-29

## 2020-01-29 LAB
CREAT SERPL-MCNC: 1.21 MG/DL (ref 0.4–1)
GFR SERPL CREATININE-BSD FRML MDRD: 43 ML/MIN/1.73M*2
GLUCOSE SERPL-MCNC: 105 MG/DL (ref 70–99)
HBA1C MFR BLD: 5.9 % (ref 4–5.6)
POTASSIUM SERPL-SCNC: 3.8 MEQ/L (ref 3.4–5.1)

## 2020-02-07 ENCOUNTER — TRANSFERRED RECORDS (OUTPATIENT)
Dept: HEALTH INFORMATION MANAGEMENT | Facility: OTHER | Age: 75
End: 2020-02-07

## 2020-02-07 LAB
EJECTION FRACTION: NORMAL %
POTASSIUM SERPL-SCNC: 4.7 MEQ/L (ref 3.4–5.1)

## 2020-02-10 ENCOUNTER — TRANSFERRED RECORDS (OUTPATIENT)
Dept: HEALTH INFORMATION MANAGEMENT | Facility: OTHER | Age: 75
End: 2020-02-10

## 2020-02-10 LAB — EJECTION FRACTION: NORMAL %

## 2020-02-11 ENCOUNTER — TRANSFERRED RECORDS (OUTPATIENT)
Dept: HEALTH INFORMATION MANAGEMENT | Facility: OTHER | Age: 75
End: 2020-02-11

## 2020-02-12 LAB
CREAT SERPL-MCNC: 1.37 MG/DL (ref 0.4–1)
GFR SERPL CREATININE-BSD FRML MDRD: 38 ML/MIN/1.73M*2
GLUCOSE SERPL-MCNC: 199 MG/DL (ref 70–99)
POTASSIUM SERPL-SCNC: 4.4 MEQ/L (ref 3.4–5.1)

## 2020-02-19 ENCOUNTER — TRANSFERRED RECORDS (OUTPATIENT)
Dept: HEALTH INFORMATION MANAGEMENT | Facility: OTHER | Age: 75
End: 2020-02-19

## 2020-03-09 ENCOUNTER — TRANSFERRED RECORDS (OUTPATIENT)
Dept: HEALTH INFORMATION MANAGEMENT | Facility: OTHER | Age: 75
End: 2020-03-09

## 2020-03-09 LAB — EJECTION FRACTION: NORMAL %

## 2020-03-24 DIAGNOSIS — J45.909 MILD ASTHMA WITHOUT COMPLICATION, UNSPECIFIED WHETHER PERSISTENT: ICD-10-CM

## 2020-03-24 RX ORDER — ALBUTEROL SULFATE 90 UG/1
2 AEROSOL, METERED RESPIRATORY (INHALATION) EVERY 4 HOURS PRN
Qty: 3 INHALER | Refills: 1 | Status: SHIPPED | OUTPATIENT
Start: 2020-03-24

## 2020-03-24 NOTE — TELEPHONE ENCOUNTER
"Patient is requesting a 90 day a supply, \"with all this stuff going on. I have been seeing my cardiologist who has filled some of my medications\".    Fluticasone-salmeterol (ADVAIR) 250-50 MCG/DOSE inhaler       Last Written Prescription Date:  01/17/2020  Last Fill Quantity: 1 inhaler ,   # refills: 0    albuterol (PROAIR HFA/PROVENTIL HFA/VENTOLIN HFA) 108 (90 Base) MCG/ACT inhaler   Last Written Prescription Date: 08/15/2018  Last Fill Quantity:  2 inhaler ,   # refills: 11  Last Office Visit: 08/15/2018  Future Office visit:       Routing refill request to provider for review/approval.         Unable to complete prescription refill per RNMedication Refill Policy.................... Madeline Ruano RN ....................  3/24/2020   2:31 PM          "

## 2021-07-12 ENCOUNTER — OFFICE VISIT (OUTPATIENT)
Dept: FAMILY MEDICINE | Facility: OTHER | Age: 76
End: 2021-07-12
Attending: PHYSICIAN ASSISTANT
Payer: COMMERCIAL

## 2021-07-12 VITALS
TEMPERATURE: 97.1 F | DIASTOLIC BLOOD PRESSURE: 110 MMHG | RESPIRATION RATE: 18 BRPM | OXYGEN SATURATION: 97 % | HEART RATE: 68 BPM | WEIGHT: 172 LBS | BODY MASS INDEX: 30.47 KG/M2 | SYSTOLIC BLOOD PRESSURE: 162 MMHG

## 2021-07-12 DIAGNOSIS — M25.431 WRIST SWELLING, RIGHT: ICD-10-CM

## 2021-07-12 DIAGNOSIS — M10.031 ACUTE IDIOPATHIC GOUT OF RIGHT WRIST: Primary | ICD-10-CM

## 2021-07-12 LAB
ANION GAP SERPL CALCULATED.3IONS-SCNC: 7 MMOL/L (ref 3–14)
BASOPHILS # BLD AUTO: 0 10E3/UL (ref 0–0.2)
BASOPHILS NFR BLD AUTO: 0 %
BUN SERPL-MCNC: 46 MG/DL (ref 7–25)
CALCIUM SERPL-MCNC: 9 MG/DL (ref 8.6–10.3)
CHLORIDE BLD-SCNC: 106 MMOL/L (ref 98–107)
CO2 SERPL-SCNC: 26 MMOL/L (ref 21–31)
CREAT SERPL-MCNC: 1.29 MG/DL (ref 0.6–1.2)
EOSINOPHIL # BLD AUTO: 0.1 10E3/UL (ref 0–0.7)
EOSINOPHIL NFR BLD AUTO: 2 %
ERYTHROCYTE [DISTWIDTH] IN BLOOD BY AUTOMATED COUNT: 14.5 % (ref 10–15)
GFR SERPL CREATININE-BSD FRML MDRD: 40 ML/MIN/1.73M2
GLUCOSE BLD-MCNC: 111 MG/DL (ref 70–105)
HCT VFR BLD AUTO: 36.4 % (ref 35–47)
HGB BLD-MCNC: 11.6 G/DL (ref 11.7–15.7)
IMM GRANULOCYTES # BLD: 0 10E3/UL
IMM GRANULOCYTES NFR BLD: 0 %
LYMPHOCYTES # BLD AUTO: 0.8 10E3/UL (ref 0.8–5.3)
LYMPHOCYTES NFR BLD AUTO: 11 %
MCH RBC QN AUTO: 28 PG (ref 26.5–33)
MCHC RBC AUTO-ENTMCNC: 31.9 G/DL (ref 31.5–36.5)
MCV RBC AUTO: 88 FL (ref 78–100)
MONOCYTES # BLD AUTO: 0.9 10E3/UL (ref 0–1.3)
MONOCYTES NFR BLD AUTO: 12 %
NEUTROPHILS # BLD AUTO: 5.4 10E3/UL (ref 1.6–8.3)
NEUTROPHILS NFR BLD AUTO: 75 %
NRBC # BLD AUTO: 0 10E3/UL
NRBC BLD AUTO-RTO: 0 /100
PLATELET # BLD AUTO: 145 10E3/UL (ref 150–450)
POTASSIUM BLD-SCNC: 4.3 MMOL/L (ref 3.5–5.1)
RBC # BLD AUTO: 4.14 10E6/UL (ref 3.8–5.2)
SODIUM SERPL-SCNC: 139 MMOL/L (ref 134–144)
URATE SERPL-MCNC: 8.9 MG/DL (ref 4.4–7.6)
WBC # BLD AUTO: 7.2 10E3/UL (ref 4–11)

## 2021-07-12 PROCEDURE — 84550 ASSAY OF BLOOD/URIC ACID: CPT | Mod: ZL | Performed by: PHYSICIAN ASSISTANT

## 2021-07-12 PROCEDURE — 80048 BASIC METABOLIC PNL TOTAL CA: CPT | Mod: ZL | Performed by: PHYSICIAN ASSISTANT

## 2021-07-12 PROCEDURE — 99214 OFFICE O/P EST MOD 30 MIN: CPT | Performed by: PHYSICIAN ASSISTANT

## 2021-07-12 PROCEDURE — G0463 HOSPITAL OUTPT CLINIC VISIT: HCPCS

## 2021-07-12 PROCEDURE — 85025 COMPLETE CBC W/AUTO DIFF WBC: CPT | Mod: ZL | Performed by: PHYSICIAN ASSISTANT

## 2021-07-12 PROCEDURE — 36415 COLL VENOUS BLD VENIPUNCTURE: CPT | Mod: ZL | Performed by: PHYSICIAN ASSISTANT

## 2021-07-12 RX ORDER — SACUBITRIL AND VALSARTAN 97; 103 MG/1; MG/1
1 TABLET, FILM COATED ORAL 2 TIMES DAILY
COMMUNITY
Start: 2021-05-14

## 2021-07-12 RX ORDER — PREDNISONE 20 MG/1
TABLET ORAL
Qty: 20 TABLET | Refills: 0 | Status: SHIPPED | OUTPATIENT
Start: 2021-07-12 | End: 2023-09-16

## 2021-07-12 RX ORDER — FUROSEMIDE 20 MG
TABLET ORAL
COMMUNITY
Start: 2021-04-09

## 2021-07-12 RX ORDER — CLONIDINE HYDROCHLORIDE 0.1 MG/1
TABLET ORAL
COMMUNITY
Start: 2021-05-14 | End: 2024-01-02

## 2021-07-12 ASSESSMENT — PAIN SCALES - GENERAL: PAINLEVEL: SEVERE PAIN (6)

## 2021-07-12 NOTE — NURSING NOTE
Chief Complaint   Patient presents with     Musculoskeletal Problem   patient presents to the clinic due to swelling of her left hand and wrist and up into arm. She states it started on Friday and has not gotten any better. She states she has not hit it or hurt it on anything. 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     FOOD SECURITY SCREENING QUESTIONS  Hunger Vital Signs:  Within the past 12 months we worried whether our food would run out before we got money to buy more. Never  Within the past 12 months the food we bought just didn't last and we didn't have money to get more. Never  Kristen Alejo LPN 7/12/2021 3:47 PM    Medication Reconciliation: complete

## 2021-07-12 NOTE — PROGRESS NOTES
ASSESSMENT/PLAN:    I have reviewed the nursing notes.  I have reviewed the findings, diagnosis, plan and need for follow up with the patient.    (M10.031) Acute idiopathic gout of right wrist  (primary encounter diagnosis)  (M25.431) Wrist swelling, right  Comment: see below  Plan: CBC and Differential, Uric acid, Basic Metabolic Panel, predniSONE (DELTASONE) 20 MG tablet        Vital signs stable.  Physical exam consistent with erythema and mild edema of dorsal right wrist.  Patient was agreeable to run labs and results returned as followed.  CBC returned with trace decrease of hemoglobin and platelet counts, all other indices returned normal.  Uric acid returned elevated at 8.9.  BMP returned with stable cross comparison of renal function.  In regards to acute diagnosis of gout, I sent through a prednisone taper beginning with 60 mg tapering down to 10 mg for patient to complete.  This was selective over an NSAID due to decreased renal function.  I did recommend that she follow-up with her PCP for changing or worsening symptoms.  If she develops fevers, chills, other signs of infection she is agreeable to return for repeat evaluation promptly. Patient is in agreement and understanding of the above treatment plan. All questions and concerns were addressed and answered to patient's satisfaction. AVS reviewed with patient.     Discussed warning signs/symptoms indicative of need to f/u    Follow up if symptoms persist or worsen or concerns    I explained my diagnostic considerations and recommendations to the patient, who voiced understanding and agreement with the treatment plan. All questions were answered. We discussed potential side effects of any prescribed or recommended therapies, as well as expectations for response to treatments.    Giuliana Duran PA-C  7/12/2021  3:49 PM    HPI:    Milena Contreras is a 75 year old female  who presents to [facility] today for presents to the clinic due to swelling of  "her right hand and wrist and up into arm. She states it started on Friday and has not gotten any better. She states she has not hit it or hurt it on anything. 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     RHD/LHD: RHD  Pain: 6/10 with motion, 0/10 with rest  Quality: \"just hurts\"  Palliative: sitting still  Provocative: motion  NTB: none  Denies bites, scratches, abrasions  History of similar symptoms: yes, but not to this extent  Gout: years ago  She does have renal disease and CHF  Allergies: Beer, Cats  Additional symptoms: none  She does have a pacemaker - 1 year    Treatments: Excedrin, takes Lasix daily 20 mg tablet    PCP: MD AVERY    Past Medical History:   Diagnosis Date     CHF (congestive heart failure) (H) 2019    echo 9/19:  EF 25%, moderate effusion     Chronic kidney disease     2014,Cr 1.3; +proteinuria     Hyperlipidemia     No Comments Provided     Hyperuricemia without signs of inflammatory arthritis and tophaceous disease     With gout x3     Nonscarring hair loss     No Comments Provided     Obesity     No Comments Provided     Type 2 diabetes mellitus without complications (H)     2012,DM2 - referred to [facility]     Uncomplicated asthma     No Comments Provided     Past Surgical History:   Procedure Laterality Date     COLONOSCOPY      01/2004,a few external hemorrhoids     COLONOSCOPY  04/03/2014    4/3/2014,diverticulosis fu 10 yrs     FRACTURE SURGERY      Left finger ORIF for fracture     HYSTERECTOMY TOTAL ABDOMINAL, BILATERAL SALPINGO-OOPHORECTOMY, COMBINED      JIMMY/BSO for uterine fibroids     Social History     Tobacco Use     Smoking status: Never Smoker     Smokeless tobacco: Never Used   Substance Use Topics     Alcohol use: No     Alcohol/week: 0.0 standard drinks     Current Outpatient Medications   Medication Sig Dispense Refill     albuterol (PROAIR HFA/PROVENTIL HFA/VENTOLIN HFA) 108 (90 Base) MCG/ACT inhaler Inhale 2 puffs into the lungs every " 4 hours as needed 3 Inhaler 1     amLODIPine (NORVASC) 10 MG tablet Take 1 tablet (10 mg) by mouth daily 90 tablet 3     aspirin 81 MG chewable tablet Take 81 mg by mouth daily with food       calcium-vitamin D (CALTRATE) 600-400 MG-UNIT per tablet Take 1 tablet by mouth 2 times daily       carvedilol (COREG) 6.25 MG tablet TAKE ONE TABLET ORALLY TWO TIMES A DAY WITH FOOD 180 tablet 0     Cholecalciferol (VITAMIN D3) 1.25 MG (78305 UT) TABS Take 1 tablet by mouth       cloNIDine (CATAPRES) 0.1 MG tablet        ENTRESTO  MG per tablet        fish oil-omega-3 fatty acids 1000 MG capsule Take 1 capsule by mouth daily       fluticasone-salmeterol (ADVAIR) 250-50 MCG/DOSE inhaler Inhale 1 Puff into the lungs two times a day. Rinse mouth after use. 3 Inhaler 1     furosemide (LASIX) 20 MG tablet        simvastatin (ZOCOR) 20 MG tablet Take 1 tablet (20 mg) by mouth At Bedtime 90 tablet 3     allopurinol (ZYLOPRIM) 100 MG tablet TAKE ONE TABLET ORALLY ONCE A DAY (Patient not taking: Reported on 7/12/2021) 90 tablet 3     blood glucose monitoring (AMANDA CONTOUR) test strip Testing twice daily E11.65 (Patient not taking: Reported on 7/12/2021)       lisinopril-hydrochlorothiazide (PRINZIDE/ZESTORETIC) 20-25 MG per tablet TAKE ONE TABLET ORALLY ONCE A DAY (Patient not taking: Reported on 7/12/2021) 90 tablet 3     metFORMIN (GLUCOPHAGE) 1000 MG tablet Take 1 tablet (1,000 mg) by mouth daily (with breakfast) (Patient not taking: Reported on 7/12/2021) 90 tablet 3     Allergies   Allergen Reactions     Food Unknown     BEER HOPPS: RED BLOTCHY SKIN     Cats Rash     And BEER  And BEER     Past medical history, past surgical history, current medications and allergies reviewed and accurate to the best of my knowledge.      ROS:  Refer to HPI    BP (!) 162/110 (BP Location: Right arm, Patient Position: Sitting, Cuff Size: Adult Regular)   Pulse 68   Temp 97.1  F (36.2  C) (Tympanic)   Resp 18   Wt 78 kg (172 lb)   SpO2  97%   Breastfeeding No   BMI 30.47 kg/m      EXAM:  General Appearance: Well appearing 75-year-old female, appropriate appearance for age. No acute distress  Respiratory: normal chest wall and respirations.  Normal effort.  Clear to auscultation bilaterally, no wheezing, crackles or rhonchi.  No increased work of breathing.  No cough appreciated.  Cardiac: RRR with no murmurs  Musculoskeletal:    Right HAND/WRIST PHYSICAL EXAMINATION:  General Examination of the wrist: no signs of bony deformity. Skin is intact with no signs of current or previous trauma to the region.  Edema to dorsal aspect extending from MCP joints down to DRUJ.  Mild erythema.    Palpation: No bony or soft tissue tenderness over right wrist or hand.    ROM: Normal wrist flexion, extension, radial and ulnar deviation as well as pronation and supination.  Normal thumb abduction, abduction, flexion and extension.  Normal digits 2 through 5 range of motion at MCP, DIP and PIP joint levels.  Normal oppositional  motion.    Strength.  Normal oppositional  strength     Muscular atrophy: No    Neurovascular status: Sensation is intact in the radial, ulnar and median nerve distributions supplying the distal hand/fingers. [place] test is normal. Distal pulses 2+.     Dermatological: no rashes noted of exposed skin  Psychological: normal affect, alert, oriented, and pleasant.     Labs:  CBC returned with trace decrease of hemoglobin and platelet counts, all other indices returned normal.  Uric acid returned elevated at 8.9.  BMP returned with stable cross comparison of renal function.     Xray:  None

## 2022-11-02 ENCOUNTER — TRANSFERRED RECORDS (OUTPATIENT)
Dept: MULTI SPECIALTY CLINIC | Facility: CLINIC | Age: 77
End: 2022-11-02

## 2022-11-02 LAB
ALBUMIN (URINE) MG/SPEC: 129.7 MG/DL
ALBUMIN/CREATININE RATIO: 1827 (ref 0–29)
CREATININE (EXTERNAL): 1.37 MG/DL (ref 0.4–1)
CREATININE (URINE): 71 MG/DL
GFR ESTIMATED (EXTERNAL): 40 ML/MIN/1.73M*2
GLUCOSE (EXTERNAL): 153 MG/DL (ref 70–99)
HBA1C MFR BLD: 6.3 % (ref 4–5.6)
POTASSIUM (EXTERNAL): 4.3 MEQ/L (ref 3.4–5.1)

## 2023-09-16 ENCOUNTER — OFFICE VISIT (OUTPATIENT)
Dept: FAMILY MEDICINE | Facility: OTHER | Age: 78
End: 2023-09-16
Attending: NURSE PRACTITIONER
Payer: COMMERCIAL

## 2023-09-16 VITALS
WEIGHT: 207.6 LBS | HEART RATE: 72 BPM | HEIGHT: 63 IN | TEMPERATURE: 97.2 F | DIASTOLIC BLOOD PRESSURE: 64 MMHG | BODY MASS INDEX: 36.79 KG/M2 | RESPIRATION RATE: 18 BRPM | OXYGEN SATURATION: 93 % | SYSTOLIC BLOOD PRESSURE: 122 MMHG

## 2023-09-16 DIAGNOSIS — J45.901 EXACERBATION OF PERSISTENT ASTHMA, UNSPECIFIED ASTHMA SEVERITY: Primary | ICD-10-CM

## 2023-09-16 PROCEDURE — G0463 HOSPITAL OUTPT CLINIC VISIT: HCPCS

## 2023-09-16 PROCEDURE — 99213 OFFICE O/P EST LOW 20 MIN: CPT | Performed by: NURSE PRACTITIONER

## 2023-09-16 RX ORDER — AZITHROMYCIN 250 MG/1
TABLET, FILM COATED ORAL
Qty: 6 TABLET | Refills: 0 | Status: SHIPPED | OUTPATIENT
Start: 2023-09-16 | End: 2023-09-21

## 2023-09-16 RX ORDER — DAPAGLIFLOZIN 10 MG/1
10 TABLET, FILM COATED ORAL
COMMUNITY
Start: 2022-10-04

## 2023-09-16 RX ORDER — PREDNISONE 20 MG/1
20 TABLET ORAL DAILY
Qty: 5 TABLET | Refills: 0 | Status: SHIPPED | OUTPATIENT
Start: 2023-09-16 | End: 2023-09-21

## 2023-09-16 ASSESSMENT — PAIN SCALES - GENERAL: PAINLEVEL: NO PAIN (0)

## 2023-09-16 NOTE — PROGRESS NOTES
Patient presents to the clinic for cough x 3 weeks.         FOOD SECURITY SCREENING QUESTIONS  Hunger Vital Signs:  Within the past 12 months we worried whether our food would run out before we got money to buy more. Never  Within the past 12 months the food we bought just didn't last and we didn't have money to get more. Never  Medication Reconciliation: complete  Chantel Perez, SHAHID 9/16/2023 11:42 AM

## 2023-09-16 NOTE — PROGRESS NOTES
"  Assessment & Plan   Problem List Items Addressed This Visit    None  Visit Diagnoses       Exacerbation of persistent asthma, unspecified asthma severity    -  Primary    Relevant Medications    predniSONE (DELTASONE) 20 MG tablet    azithromycin (ZITHROMAX) 250 MG tablet        Cough for 3 weeks, concerned about asthma exacerbation.  We will do a course of prednisone and azithromycin.  Did not do imaging at this time as I did not feel it would change treatment plan.  Close follow-up if symptoms persist or worsen.      Prescription drug management         BMI:   Estimated body mass index is 36.77 kg/m  as calculated from the following:    Height as of this encounter: 1.6 m (5' 3\").    Weight as of this encounter: 94.2 kg (207 lb 9.6 oz).           No follow-ups on file.    RUBY Young [facility]    Teo Patel is a 77 year old, presenting for the following health issues:  Cough      HPI     She comes in with a friend for evaluation of a cough.  She reports a tickle in her throat started approximate 3 weeks ago that has progressed into a cough, sometimes productive.  She denies any chest pains or fevers.  Does have shortness of breath related to her asthma.  Has been using her inhaler with described.  Denies any changes in her weight or swelling in her legs.      Review of Systems   See above      Objective    /64 (BP Location: Right arm, Patient Position: Sitting, Cuff Size: Adult Large)   Pulse 72   Temp 97.2  F (36.2  C) (Tympanic)   Resp 18   Ht 1.6 m (5' 3\")   Wt 94.2 kg (207 lb 9.6 oz)   SpO2 93%   BMI 36.77 kg/m    Body mass index is 36.77 kg/m .  Physical Exam   GENERAL: healthy, alert and no distress  EYES: Eyes grossly normal to inspection, PERRL and conjunctivae and sclerae normal  HENT: ear canals and TM's normal, nose and mouth without ulcers or lesions  NECK: no adenopathy, no asymmetry, masses, or scars and thyroid normal to palpation  RESP: lungs " clear to auscultation with scattered wheezes, O2 sats 93% on room air, occasional cough  CV: regular rate and rhythm, normal S1 S2  NEURO: Normal strength and tone, mentation intact and speech normal  PSYCH: mentation appears normal, affect normal/bright

## 2023-09-21 ENCOUNTER — TELEPHONE (OUTPATIENT)
Dept: FAMILY MEDICINE | Facility: OTHER | Age: 78
End: 2023-09-21
Payer: COMMERCIAL

## 2023-09-21 NOTE — TELEPHONE ENCOUNTER
Refill of antibiotic is not appropriate. Azithromycin is still working in system for a few days and cough can continue to linger for up to a couple weeks. If she has ongoing concerns, she should be seen in clinic or [facility]. RUBY Young CNP on 9/21/2023 at 10:51 AM

## 2023-09-21 NOTE — TELEPHONE ENCOUNTER
Please call the patient.  She is out of her RX - Zpak and wants another one.  Send to [facility].            Hetal Wellington on 9/21/2023 at 9:30 AM

## 2023-09-21 NOTE — TELEPHONE ENCOUNTER
Date of birth and last name verified.    I did relay Dr. De La Rosa's comments to patient.  She does state understanding.    She also state she rally thinks she needs more.  I did encourage to either go to the [facility] or see if she get an appointment with anyone in the clinic.    Omar Giron .......  9/21/2023  11:18 AM

## 2023-09-21 NOTE — TELEPHONE ENCOUNTER
S-(situation): Patient requesting a refill of her Zpack.    B-(background): Patient seen in clinic on 9/16/23 for Exacerbation of persistent asthma and was started on Zpack and prednisone. Patient states she is 75% better but is still having coughing/build up in her lungs so when she lays down she coughs up a lot of clear sputum. Afebrile. Last dose of antibiotic was 9/20/23.       A-(assessment): Her exacerbation is not fully resolved.     R-(recommendations): informed patient will route this information to Naya Enamorado for review and consideration of an antibiotic.     CAT PITTS RN on 9/21/2023 at 10:19 AM

## 2024-01-02 RX ORDER — EZETIMIBE 10 MG/1
10 TABLET ORAL DAILY
Status: ON HOLD | COMMUNITY
End: 2024-01-09

## 2024-01-02 RX ORDER — ROSUVASTATIN CALCIUM 40 MG/1
40 TABLET, COATED ORAL DAILY
COMMUNITY

## 2024-01-04 ENCOUNTER — ANESTHESIA EVENT (OUTPATIENT)
Dept: SURGERY | Facility: HOSPITAL | Age: 79
End: 2024-01-04
Payer: MEDICARE

## 2024-01-04 ASSESSMENT — ENCOUNTER SYMPTOMS: DYSRHYTHMIAS: 1

## 2024-01-04 NOTE — ANESTHESIA PREPROCEDURE EVALUATION
Anesthesia Pre-Procedure Evaluation    Patient: Milena Contreras   MRN: [id number] : 1945        Procedure : Procedure(s):  Phacoemulsification Cataract Extraction Posterior Chamber Lens Right Eye          Past Medical History:   Diagnosis Date     CHF (congestive heart failure) (H)     echo :  EF 25%, moderate effusion     Chronic kidney disease     ,Cr 1.3; +proteinuria     Hyperlipidemia     No Comments Provided     Hyperuricemia without signs of inflammatory arthritis and tophaceous disease     With gout x3     Nonscarring hair loss     No Comments Provided     Obesity     No Comments Provided     Type 2 diabetes mellitus without complications (H)     ,DM2 - referred to [facility]     Uncomplicated asthma     No Comments Provided      Past Surgical History:   Procedure Laterality Date     COLONOSCOPY      2004,a few external hemorrhoids     COLONOSCOPY  2014    4/3/2014,diverticulosis fu 10 yrs     FRACTURE SURGERY      Left finger ORIF for fracture     HYSTERECTOMY TOTAL ABDOMINAL, BILATERAL SALPINGO-OOPHORECTOMY, COMBINED      JIMMY/BSO for uterine fibroids      Allergies   Allergen Reactions     Food Unknown     BEER HOPPS: RED BLOTCHY SKIN     Cats Rash     And BEER  And BEER      Social History     Tobacco Use     Smoking status: Never     Smokeless tobacco: Never   Substance Use Topics     Alcohol use: No     Alcohol/week: 0.0 standard drinks of alcohol      Wt Readings from Last 1 Encounters:   23 94.2 kg (207 lb 9.6 oz)        Anesthesia Evaluation   Pt has had prior anesthetic. Type: MAC and General.        ROS/MED HX  ENT/Pulmonary:     (+)     TAYO risk factors,  hypertension, obese,             Mild Persistent, asthma (advair) Last exacerbation: 23,  Treatment: Inhaler daily and Inhaler prn,                 Neurologic:  - neg neurologic ROS     Cardiovascular: Comment: Hx of LBBB, Prolonged QT interval  Hx of pericardial effusion  Ischemic heart disease with no  angina in the past 30 days  Ischemic cardiomyopathy  Celiac artery aneurysm     (+) Dyslipidemia hypertension- -  CAD - past MI - stent-4/4/2019. 1 Drug Eluting Stent. Taking blood thinners (asa 81 mg po daily)   CHF etiology: systolic and diastolic    OLEARY.   pacemaker,     ICD (Last ICD discharge 11/28/23) Reason placed:2019 & 2020?.  type;defibrilator - biventricular   dysrhythmias, a-fib and Other,       pulmonary hypertension (mild), Previous cardiac testing   Echo: Date: 3/9/20 Results:  2020: EF 30-35%  Stress Test:  Date: Results:    ECG Reviewed:  Date: 12/29/23 Results:  12/29/23: HR 60, AV dual paced rhythm. When compared to ECG of 4-2022, electronic ventricular pacemaker has replaced sinus rhythm  Cath:  Date: 2019 Results:      METS/Exercise Tolerance: 1 - Eating, dressing    Hematologic:  - neg hematologic  ROS     Musculoskeletal: Comment: Hyperuricemia without signs of inflammatory arthritis and tophaceous disease  Osteopenia  DJD of right AC joint  (+)  arthritis (bilateral knees),             GI/Hepatic: Comment: Incisional hernia  Hx of SBO      Renal/Genitourinary:     (+) renal disease (Stage III), type: CRI,            Endo:     (+)  type II DM (controlled by diet and Farxiga (depagliflozin)), Last HgA1c: 7.1, date: 11/2/23, Not using insulin, - not using insulin pump.         Obesity,       Psychiatric/Substance Use:  - neg psychiatric ROS     Infectious Disease:  - neg infectious disease ROS     Malignancy:  - neg malignancy ROS     Other: Comment: Psoriasis  Alopecia  Vision deficit  Ambulates with walker           Physical Exam    Airway        Mallampati: IV   TM distance: > 3 FB   Neck ROM: full   Mouth opening: > 3 cm    Respiratory Devices and Support         Dental       (+) Modest Abnormalities - crowns, retainers, 1 or 2 missing teeth      Cardiovascular          Rhythm and rate: irregular and normal     Pulmonary   pulmonary exam normal            OUTSIDE LABS:  CBC:   Lab Results  "  Component Value Date    WBC 7.2 07/12/2021    WBC 10.6 08/15/2018    HGB 11.6 (L) 07/12/2021    HGB 12.7 08/15/2018    HCT 36.4 07/12/2021    HCT 39.5 08/15/2018     (L) 07/12/2021     08/15/2018     BMP:   Lab Results   Component Value Date     07/12/2021     08/15/2018    POTASSIUM 4.3 07/12/2021    POTASSIUM 4.4 02/12/2020    CHLORIDE 106 07/12/2021    CHLORIDE 101 08/15/2018    CO2 26 07/12/2021    CO2 32 (H) 08/15/2018    BUN 46 (H) 07/12/2021    BUN 28 (H) 08/15/2018    CR 1.29 (H) 07/12/2021    CR 1.37 (A) 02/12/2020     (H) 07/12/2021     (A) 02/12/2020     COAGS: No results found for: \"PTT\", \"INR\", \"FIBR\"  POC: No results found for: \"BGM\", \"HCG\", \"HCGS\"  HEPATIC:   Lab Results   Component Value Date    ALBUMIN 4.1 08/15/2018    PROTTOTAL 7.2 08/15/2018    ALT 13 08/15/2018    AST 14 08/15/2018    ALKPHOS 64 08/15/2018    BILITOTAL 0.6 08/15/2018     OTHER:   Lab Results   Component Value Date    A1C 5.9 (A) 01/29/2020    USHA 9.0 07/12/2021       Anesthesia Plan    ASA Status:  4    NPO Status:  NPO Appropriate    Anesthesia Type: MAC.     - Reason for MAC: straight local not clinically adequate   Induction: N/a.   Maintenance: N/A.        Consents    Anesthesia Plan(s) and associated risks, benefits, and realistic alternatives discussed. Questions answered and patient/representative(s) expressed understanding.     - Discussed: Risks, Benefits and Alternatives for BOTH SEDATION and the PROCEDURE were discussed     - Discussed with:  Patient      - Extended Intubation/Ventilatory Support Discussed: No.      - Patient is DNR/DNI Status: No     Use of blood products discussed: No .     Postoperative Care            Comments:    Other Comments: Risks and benefits of MAC anesthetic discussed including dental damage, aspiration, loss of airway, conversion to general anesthetic, CV complications, MI, stroke, death. Pt wishes to proceed. HP Lanphear 12/29/23 (addressed right " eyed 1/9/24 and left eye at end of month)    Per Pre-op Saw cardiology in November - stable (I cannot find this document, only can find [facility] appt from 2022 - asked Emely to request most recent cards note from November) 1/4/23 0950 HL           Kelly Escobedo, APRN CNP    I have reviewed the pertinent notes and labs in the chart from the past 30 days and (re)examined the patient.  Any updates or changes from those notes are reflected in this note.                   abdominal pain

## 2024-01-09 ENCOUNTER — ANESTHESIA (OUTPATIENT)
Dept: SURGERY | Facility: HOSPITAL | Age: 79
End: 2024-01-09
Payer: MEDICARE

## 2024-01-09 ENCOUNTER — HOSPITAL ENCOUNTER (OUTPATIENT)
Facility: HOSPITAL | Age: 79
Discharge: HOME | End: 2024-01-09
Attending: OPHTHALMOLOGY | Admitting: OPHTHALMOLOGY
Payer: MEDICARE

## 2024-01-09 VITALS
TEMPERATURE: 97 F | WEIGHT: 205 LBS | DIASTOLIC BLOOD PRESSURE: 81 MMHG | HEIGHT: 63 IN | RESPIRATION RATE: 16 BRPM | SYSTOLIC BLOOD PRESSURE: 128 MMHG | BODY MASS INDEX: 36.32 KG/M2 | OXYGEN SATURATION: 95 % | HEART RATE: 60 BPM

## 2024-01-09 LAB — GLUCOSE BLDC GLUCOMTR-MCNC: 138 MG/DL (ref 70–99)

## 2024-01-09 PROCEDURE — 360N000076 HC SURGERY LEVEL 3, PER MIN: Performed by: OPHTHALMOLOGY

## 2024-01-09 PROCEDURE — 250N000011 HC RX IP 250 OP 636: Performed by: OPHTHALMOLOGY

## 2024-01-09 PROCEDURE — 370N000017 HC ANESTHESIA TECHNICAL FEE, PER MIN: Performed by: OPHTHALMOLOGY

## 2024-01-09 PROCEDURE — 99100 ANES PT EXTEME AGE<1 YR&>70: CPT | Performed by: NURSE ANESTHETIST, CERTIFIED REGISTERED

## 2024-01-09 PROCEDURE — 250N000013 HC RX MED GY IP 250 OP 250 PS 637: Performed by: OPHTHALMOLOGY

## 2024-01-09 PROCEDURE — 250N000011 HC RX IP 250 OP 636: Performed by: NURSE ANESTHETIST, CERTIFIED REGISTERED

## 2024-01-09 PROCEDURE — V2632 POST CHMBR INTRAOCULAR LENS: HCPCS | Performed by: OPHTHALMOLOGY

## 2024-01-09 PROCEDURE — 999N000141 HC STATISTIC PRE-PROCEDURE NURSING ASSESSMENT: Performed by: OPHTHALMOLOGY

## 2024-01-09 PROCEDURE — 250N000009 HC RX 250: Performed by: OPHTHALMOLOGY

## 2024-01-09 PROCEDURE — 82962 GLUCOSE BLOOD TEST: CPT

## 2024-01-09 PROCEDURE — 272N000001 HC OR GENERAL SUPPLY STERILE: Performed by: OPHTHALMOLOGY

## 2024-01-09 PROCEDURE — 66984 XCAPSL CTRC RMVL W/O ECP: CPT | Performed by: NURSE ANESTHETIST, CERTIFIED REGISTERED

## 2024-01-09 PROCEDURE — 710N000012 HC RECOVERY PHASE 2, PER MINUTE: Performed by: OPHTHALMOLOGY

## 2024-01-09 DEVICE — IMPLANTABLE DEVICE: Type: IMPLANTABLE DEVICE | Site: EYE | Status: FUNCTIONAL

## 2024-01-09 RX ORDER — MOXIFLOXACIN 5 MG/ML
SOLUTION/ DROPS OPHTHALMIC PRN
Status: DISCONTINUED | OUTPATIENT
Start: 2024-01-09 | End: 2024-01-09 | Stop reason: HOSPADM

## 2024-01-09 RX ORDER — TETRACAINE HYDROCHLORIDE 5 MG/ML
SOLUTION OPHTHALMIC PRN
Status: DISCONTINUED | OUTPATIENT
Start: 2024-01-09 | End: 2024-01-09 | Stop reason: HOSPADM

## 2024-01-09 RX ORDER — CYCLOPENTOLATE HYDROCHLORIDE 20 MG/ML
1 SOLUTION/ DROPS OPHTHALMIC ONCE
Status: COMPLETED | OUTPATIENT
Start: 2024-01-09 | End: 2024-01-09

## 2024-01-09 RX ORDER — LIDOCAINE 40 MG/G
CREAM TOPICAL
Status: DISCONTINUED | OUTPATIENT
Start: 2024-01-09 | End: 2024-01-09 | Stop reason: HOSPADM

## 2024-01-09 RX ORDER — PILOCARPINE HYDROCHLORIDE 10 MG/ML
SOLUTION/ DROPS OPHTHALMIC PRN
Status: DISCONTINUED | OUTPATIENT
Start: 2024-01-09 | End: 2024-01-09 | Stop reason: HOSPADM

## 2024-01-09 RX ORDER — ONDANSETRON 2 MG/ML
4 INJECTION INTRAMUSCULAR; INTRAVENOUS EVERY 30 MIN PRN
Status: DISCONTINUED | OUTPATIENT
Start: 2024-01-09 | End: 2024-01-09 | Stop reason: HOSPADM

## 2024-01-09 RX ORDER — LABETALOL 20 MG/4 ML (5 MG/ML) INTRAVENOUS SYRINGE
10
Status: DISCONTINUED | OUTPATIENT
Start: 2024-01-09 | End: 2024-01-09 | Stop reason: HOSPADM

## 2024-01-09 RX ORDER — PHENYLEPHRINE HYDROCHLORIDE 100 MG/ML
1 SOLUTION/ DROPS OPHTHALMIC ONCE
Status: COMPLETED | OUTPATIENT
Start: 2024-01-09 | End: 2024-01-09

## 2024-01-09 RX ORDER — ONDANSETRON 4 MG/1
4 TABLET, ORALLY DISINTEGRATING ORAL EVERY 30 MIN PRN
Status: DISCONTINUED | OUTPATIENT
Start: 2024-01-09 | End: 2024-01-09 | Stop reason: HOSPADM

## 2024-01-09 RX ORDER — ACETAMINOPHEN 325 MG/1
650 TABLET ORAL ONCE
Status: COMPLETED | OUTPATIENT
Start: 2024-01-09 | End: 2024-01-09

## 2024-01-09 RX ORDER — PROPARACAINE HYDROCHLORIDE 5 MG/ML
1 SOLUTION/ DROPS OPHTHALMIC ONCE
Status: COMPLETED | OUTPATIENT
Start: 2024-01-09 | End: 2024-01-09

## 2024-01-09 RX ORDER — LEVOBUNOLOL HYDROCHLORIDE 5 MG/ML
SOLUTION/ DROPS OPHTHALMIC PRN
Status: DISCONTINUED | OUTPATIENT
Start: 2024-01-09 | End: 2024-01-09 | Stop reason: HOSPADM

## 2024-01-09 RX ORDER — PROPARACAINE HYDROCHLORIDE 5 MG/ML
1 SOLUTION/ DROPS OPHTHALMIC
Status: COMPLETED | OUTPATIENT
Start: 2024-01-09 | End: 2024-01-09

## 2024-01-09 RX ORDER — CYCLOPENTOLAT/TROPIC/PHENYLEPH 1%-1%-2.5%
1 DROPS (EA) OPHTHALMIC (EYE)
Status: COMPLETED | OUTPATIENT
Start: 2024-01-09 | End: 2024-01-09

## 2024-01-09 RX ORDER — MOXIFLOXACIN 5 MG/ML
1 SOLUTION/ DROPS OPHTHALMIC
Status: COMPLETED | OUTPATIENT
Start: 2024-01-09 | End: 2024-01-09

## 2024-01-09 RX ORDER — PHENYLEPHRINE HYDROCHLORIDE 100 MG/ML
1 SOLUTION/ DROPS OPHTHALMIC
Status: DISCONTINUED | OUTPATIENT
Start: 2024-01-09 | End: 2024-01-09 | Stop reason: HOSPADM

## 2024-01-09 RX ORDER — LIDOCAINE HYDROCHLORIDE 20 MG/ML
JELLY TOPICAL ONCE
Status: COMPLETED | OUTPATIENT
Start: 2024-01-09 | End: 2024-01-09

## 2024-01-09 RX ORDER — SODIUM CHLORIDE, SODIUM LACTATE, POTASSIUM CHLORIDE, CALCIUM CHLORIDE 600; 310; 30; 20 MG/100ML; MG/100ML; MG/100ML; MG/100ML
INJECTION, SOLUTION INTRAVENOUS CONTINUOUS
Status: DISCONTINUED | OUTPATIENT
Start: 2024-01-09 | End: 2024-01-09 | Stop reason: HOSPADM

## 2024-01-09 RX ORDER — LIDOCAINE HYDROCHLORIDE 10 MG/ML
INJECTION, SOLUTION EPIDURAL; INFILTRATION; INTRACAUDAL; PERINEURAL PRN
Status: DISCONTINUED | OUTPATIENT
Start: 2024-01-09 | End: 2024-01-09 | Stop reason: HOSPADM

## 2024-01-09 RX ORDER — HYDRALAZINE HYDROCHLORIDE 20 MG/ML
2.5-5 INJECTION INTRAMUSCULAR; INTRAVENOUS EVERY 10 MIN PRN
Status: DISCONTINUED | OUTPATIENT
Start: 2024-01-09 | End: 2024-01-09 | Stop reason: HOSPADM

## 2024-01-09 RX ADMIN — ACETAMINOPHEN 650 MG: 325 TABLET, FILM COATED ORAL at 10:19

## 2024-01-09 RX ADMIN — CYCLOPENTOLATE HYDROCHLORIDE 1 DROP: 20 SOLUTION/ DROPS OPHTHALMIC at 10:24

## 2024-01-09 RX ADMIN — MOXIFLOXACIN 1 DROP: 5 SOLUTION/ DROPS OPHTHALMIC at 10:31

## 2024-01-09 RX ADMIN — MOXIFLOXACIN 1 DROP: 5 SOLUTION/ DROPS OPHTHALMIC at 10:36

## 2024-01-09 RX ADMIN — LIDOCAINE HYDROCHLORIDE: 20 JELLY TOPICAL at 10:49

## 2024-01-09 RX ADMIN — MIDAZOLAM 0.5 MG: 1 INJECTION INTRAMUSCULAR; INTRAVENOUS at 12:12

## 2024-01-09 RX ADMIN — Medication 1 DROP: at 10:48

## 2024-01-09 RX ADMIN — Medication 1 DROP: at 10:29

## 2024-01-09 RX ADMIN — PHENYLEPHRINE HYDROCHLORIDE 1 DROP: 100 SOLUTION/ DROPS OPHTHALMIC at 10:26

## 2024-01-09 RX ADMIN — PROPARACAINE HYDROCHLORIDE 1 DROP: 5 SOLUTION/ DROPS OPHTHALMIC at 10:20

## 2024-01-09 RX ADMIN — MOXIFLOXACIN 1 DROP: 5 SOLUTION/ DROPS OPHTHALMIC at 10:42

## 2024-01-09 RX ADMIN — PROPARACAINE HYDROCHLORIDE 1 DROP: 5 SOLUTION/ DROPS OPHTHALMIC at 10:47

## 2024-01-09 ASSESSMENT — ACTIVITIES OF DAILY LIVING (ADL)
ADLS_ACUITY_SCORE: 20
ADLS_ACUITY_SCORE: 20

## 2024-01-09 NOTE — ANESTHESIA CARE TRANSFER NOTE
Patient: Milena Contreras    Procedure: Procedure(s):  Phacoemulsification Cataract Extraction Posterior Chamber Lens Right Eye       Diagnosis: Combined form of age-related cataract, right eye [H25.811]  Diagnosis Additional Information: No value filed.    Anesthesia Type:   MAC     Note:    Oropharynx: spontaneously breathing  Level of Consciousness: awake  Oxygen Supplementation: room air    Independent Airway: airway patency satisfactory and stable  Dentition: dentition unchanged  Vital Signs Stable: post-procedure vital signs reviewed and stable  Report to RN Given: handoff report given  Patient transferred to: Phase II    Handoff Report: Identifed the Patient, Identified the Reponsible Provider, Reviewed the pertinent medical history, Discussed the surgical course, Reviewed Intra-OP anesthesia mangement and issues during anesthesia, Set expectations for post-procedure period and Allowed opportunity for questions and acknowledgement of understanding    Vitals:  Vitals Value Taken Time   BP     Temp     Pulse     Resp     SpO2         Electronically Signed By: RUBY Rodriguez CRNA  January 9, 2024  12:47 PM

## 2024-01-09 NOTE — OR NURSING
Patient and responsible adult given discharge instructions with no questions regarding instructions. Bull score 20. Pain level 0/10.  Discharged from unit via wheelchair. Patient discharged to home.

## 2024-01-09 NOTE — INTERVAL H&P NOTE
I have reviewed the surgical (or preoperative) H&P that is linked to this encounter, and examined the patient. There are no significant changes.  Guy Gutierres MD      Clinical Conditions Present on Arrival:  Clinically Significant Risk Factors Present on Admission

## 2024-01-09 NOTE — OP NOTE
[facility]  Ophthalmology Full Operative Note    Pre-operative diagnosis: Combined form of age-related cataract, right eye [H25.811]   Post-operative diagnosis Same   Procedure: Procedure(s):  Phacoemulsification Cataract Extraction Posterior Chamber Lens Right Eye   Surgeon: Guy Gutierres MD   Assistants(s):    Anesthesia: MAC with Local    Estimated blood loss: None    Total IV fluids: (See anesthesia record)   Specimens: None   Implants: 15.5 LI61AO   Findings:    Complications: None   Condition: Stable   Comments:       PROCEDURAL ANESTHESIA:     Topical/MAC.  Lidocaine 2% jelly topically and Lidocaine 1% preservative-free intracamerally.     PROCEDURE:  The patient was brought to the Operating Room and prepped and draped in a sterile manner.  A wire lid speculum was placed.  A paracentesis was created and 1% Lidocaine was instilled in the anterior chamber.  The anterior chamber was then filled with Amvisc viscoelastic.  A clear cornea temporal wound was created using a 2.8 mm keratome.  A cystotome was used to initiate a flap in the anterior capsule and a Utrata forceps was used to create a continuous tear capsulorhexis.  Hydrodissection was performed.  The phacoemulsification tip was inserted into the eye and the nucleus and epinucleus were removed using a divide and conquer technique.  The residual cortex was removed using the I/A handpiece.  The anterior chamber was then refilled with viscoelastic and the wound was enlarged with the keratome.  The intraocular lens, 15.5 diopter, Model LI61AO, was placed into the injector and injected into the capsular bag. It was checked to make sure that it was central and stable.  Residual viscoelastic was removed using the I/A.  The anterior chamber was refilled with BSS.  The wounds were hydrated with BSS and were noted to be watertight with no suture necessary.  Topical pilocarpine 1%, Betagan, and Vigamox was applied.  A hard shield was placed.     The  patient tolerated the procedure well and was sent to the Recovery Room in satisfactory condition.

## 2024-01-09 NOTE — OR NURSING
Eye checked for dilation.  Eye is currently 7-8/10; no further drops needed at this time.  Patient doing well, no complaints or needs at this time.

## 2024-01-09 NOTE — ANESTHESIA POSTPROCEDURE EVALUATION
Patient: Milena Contreras    Procedure: Procedure(s):  Phacoemulsification Cataract Extraction Posterior Chamber Lens Right Eye       Anesthesia Type:  MAC    Note:  Disposition: Outpatient   Postop Pain Control: Uneventful            Sign Out: Well controlled pain   PONV: No   Neuro/Psych: Uneventful            Sign Out: Acceptable/Baseline neuro status   Airway/Respiratory: Uneventful            Sign Out: Acceptable/Baseline resp. status   CV/Hemodynamics: Uneventful            Sign Out: Acceptable CV status; No obvious hypovolemia; No obvious fluid overload   Other NRE: NONE   DID A NON-ROUTINE EVENT OCCUR? No         Last vitals:  Vitals Value Taken Time   /119 01/09/24 1300   Temp     Pulse 60 01/09/24 1300   Resp 16 01/09/24 1255   SpO2 93 % 01/09/24 1303   Vitals shown include unfiled device data.    Electronically Signed By: URBY Garber CRNA  January 9, 2024  1:52 PM

## 2024-01-09 NOTE — DISCHARGE INSTRUCTIONS
After Anesthesia (Sleep Medicine)  What should I do after anesthesia?  You should rest and relax for the next 24 hours. Avoid risky or difficult (strenuous) activity. A responsible adult should stay with you overnight.  Don't drive or use any heavy equipment for 24 hours. Even if you feel normal, your reactions may be affected by the sleep medicine given to you.  Don't drink alcohol or make any important decisions for 24 hours.  Slowly get back to your regular diet, as you feel able.  How should I expect to feel?  It's normal to feel dizzy, light-headed, or faint for up to a full day after anesthesia or while taking pain medicine. If this happens:   Sit down for a few minutes before standing.  Have someone help you when you get up to walk or use the bathroom.  If you have nausea (feel sick to your stomach) or vomit (throw up):   Drink clear liquids (such as apple juice, ginger ale, broth, or 7UP) until you feel better.  If you feel sick to your stomach, or you keep vomiting for 24 hours, please call the doctor.  What else should I know?  You might have a dry mouth, sore throat, muscle aches, or trouble sleeping. These should go away after 24 hours.  Please contact your doctor if you have any other symptoms that concern you, such as fever, pain, bleeding, fluid drainage, swelling, or headache, or if it's been over 8 to 10 hours and you still aren't able to pee (urinate).  If you have a history of sleep apnea, it's very important to use your CPAP machine for the next 24 hours when you nap or sleep.   For informational purposes only. Not to replace the advice of your health care provider. Copyright   2023 [employer]. All rights reserved. Clinically reviewed by Eliceo Anderson MD. [email or web address] [id number] - REV 09/23.

## 2024-01-15 NOTE — OR NURSING
Bring albuterol inhaler day of surgery, take amlodipine, carvidelol. Entresto day of surgery. Hold Furosemide.

## 2024-01-19 ENCOUNTER — ANESTHESIA EVENT (OUTPATIENT)
Dept: SURGERY | Facility: HOSPITAL | Age: 79
End: 2024-01-19
Payer: MEDICARE

## 2024-01-19 ASSESSMENT — ENCOUNTER SYMPTOMS: DYSRHYTHMIAS: 1

## 2024-01-19 NOTE — ANESTHESIA PREPROCEDURE EVALUATION
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     With gout x3     Nonscarring hair loss     No Comments Provided     Obesity     No Comments Provided     Type 2 diabetes mellitus without complications (H)     ,DM2 - referred to [facility]     Uncomplicated asthma     No Comments Provided      Past Surgical History:   Procedure Laterality Date     COLONOSCOPY      2004,a few external hemorrhoids     COLONOSCOPY  2014    4/3/2014,diverticulosis fu 10 yrs     FRACTURE SURGERY      Left finger ORIF for fracture     HYSTERECTOMY TOTAL ABDOMINAL, BILATERAL SALPINGO-OOPHORECTOMY, COMBINED      JIMMY/BSO for uterine fibroids     PHACOEMULSIFICATION WITH STANDARD INTRAOCULAR LENS IMPLANT Right 2024    Procedure: Phacoemulsification Cataract Extraction Posterior Chamber Lens Right Eye;  Surgeon: Guy Gutierres MD;  Location: [facility] OR      Allergies   Allergen Reactions     Food Unknown     BEER HOPPS: RED BLOTCHY SKIN     Cats Rash     And BEER  And BEER      Social History     Tobacco Use     Smoking status: Never     Smokeless tobacco: Never   Substance Use Topics     Alcohol use: No     Alcohol/week: 0.0 standard drinks of alcohol      Wt Readings from Last 1 Encounters:   24 93 kg (205 lb)        Anesthesia Evaluation   Pt has had prior anesthetic. Type: MAC and General.        ROS/MED HX  ENT/Pulmonary:     (+)     TAYO risk factors,  hypertension, obese,             Mild Persistent, asthma (advair) Last exacerbation: 23,  Treatment:  Inhaler daily and Inhaler prn,                 Neurologic:  - neg neurologic ROS     Cardiovascular: Comment: Hx of LBBB, Prolonged QT interval  Hx of pericardial effusion  Ischemic heart disease with no angina in the past 30 days  Ischemic cardiomyopathy  Celiac artery aneurysm     (+) Dyslipidemia hypertension- -  CAD - past MI - stent-4/4/2019. 1 Drug Eluting Stent. Taking blood thinners (asa 81 mg po daily)   CHF etiology: systolic and diastolic    OLEARY.   pacemaker,     ICD (Last ICD discharge 11/28/23) Reason placed:2019 & 2020?.  type;defibrilator - biventricular   dysrhythmias, a-fib and Other,       pulmonary hypertension (mild), Previous cardiac testing   Echo: Date: 3/9/20 Results:  2020: EF 30-35%  Stress Test:  Date: Results:    ECG Reviewed:  Date: 12/29/23 Results:  12/29/23: HR 60, AV dual paced rhythm. 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         Obesity,       Psychiatric/Substance Use:  - neg psychiatric ROS     Infectious Disease:  - neg infectious disease ROS     Malignancy:  - neg malignancy ROS     Other: Comment: Psoriasis  Alopecia  Vision deficit  Ambulates with walker - neg other ROS          Physical Exam    Airway  airway exam normal      Mallampati: III   TM distance: > 3 FB   Neck ROM: full   Mouth opening: > 3 cm    Respiratory Devices and Support      "    Dental       (+) Modest Abnormalities - crowns, retainers, 1 or 2 missing teeth and Removable bridges or other hardware      Cardiovascular   cardiovascular exam normal       Rhythm and rate: regular and normal     Pulmonary       Comment: Chronic smokers cough, clear/diminished lung sounds    (+) decreased breath sounds       OUTSIDE LABS:  CBC:   Lab Results   Component Value Date    WBC 7.2 07/12/2021    WBC 10.6 08/15/2018    HGB 11.6 (L) 07/12/2021    HGB 12.7 08/15/2018    HCT 36.4 07/12/2021    HCT 39.5 08/15/2018     (L) 07/12/2021     08/15/2018     BMP:   Lab Results   Component Value Date     07/12/2021     08/15/2018    POTASSIUM 4.3 07/12/2021    POTASSIUM 4.4 02/12/2020    CHLORIDE 106 07/12/2021    CHLORIDE 101 08/15/2018    CO2 26 07/12/2021    CO2 32 (H) 08/15/2018    BUN 46 (H) 07/12/2021    BUN 28 (H) 08/15/2018    CR 1.29 (H) 07/12/2021    CR 1.37 (A) 02/12/2020     (H) 01/09/2024     (H) 07/12/2021     COAGS: No results found for: \"PTT\", \"INR\", \"FIBR\"  POC: No results found for: \"BGM\", \"HCG\", \"HCGS\"  HEPATIC:   Lab Results   Component Value Date    ALBUMIN 4.1 08/15/2018    PROTTOTAL 7.2 08/15/2018    ALT 13 08/15/2018    AST 14 08/15/2018    ALKPHOS 64 08/15/2018    BILITOTAL 0.6 08/15/2018     OTHER:   Lab Results   Component Value Date    A1C 5.9 (A) 01/29/2020    USHA 9.0 07/12/2021       Anesthesia Plan    ASA Status:  4    NPO Status:  NPO Appropriate    Anesthesia Type: MAC.     - Reason for MAC: straight local not clinically adequate              Consents    Anesthesia Plan(s) and associated risks, benefits, and realistic alternatives discussed. Questions answered and patient/representative(s) expressed understanding.     - Discussed: Risks, Benefits and Alternatives for BOTH SEDATION and the PROCEDURE were discussed     - Discussed with:  Patient      - Extended Intubation/Ventilatory Support Discussed: No.      - Patient is DNR/DNI Status: No  " "   Use of blood products discussed: No .     Postoperative Care            Comments:    Other Comments: HP Lanphear 12/29/23    Farxiga hold   1-9-24 had 0.5mg versed, stated wasn't quite enough last time, asking for more this time.           RUBY WHELAN CRNA    I have reviewed the pertinent notes and labs in the chart from the past 30 days and (re)examined the patient.  Any updates or changes from those notes are reflected in this note.              # Obesity: Estimated body mass index is 36.31 kg/m  as calculated from the following:    Height as of 1/9/24: 1.6 m (5' 3\").    Weight as of 1/9/24: 93 kg (205 lb).      "

## 2024-01-22 NOTE — DISCHARGE INSTRUCTIONS
After Anesthesia (Sleep Medicine)  What should I do after anesthesia?  You should rest and relax for the next 24 hours. Avoid risky or difficult (strenuous) activity. A responsible adult should stay with you overnight.  Don't drive or use any heavy equipment for 24 hours. Even if you feel normal, your reactions may be affected by the sleep medicine given to you.  Don't drink alcohol or make any important decisions for 24 hours.  Slowly get back to your regular diet, as you feel able.  How should I expect to feel?  It's normal to feel dizzy, light-headed, or faint for up to a full day after anesthesia or while taking pain medicine. If this happens:   Sit down for a few minutes before standing.  Have someone help you when you get up to walk or use the bathroom.  If you have nausea (feel sick to your stomach) or vomit (throw up):   Drink clear liquids (such as apple juice, ginger ale, broth, or 7UP) until you feel better.  If you feel sick to your stomach, or you keep vomiting for 24 hours, please call the doctor.  What else should I know?  You might have a dry mouth, sore throat, muscle aches, or trouble sleeping. These should go away after 24 hours.  Please contact your doctor if you have any other symptoms that concern you, such as fever, pain, bleeding, fluid drainage, swelling, or headache, or if it's been over 8 to 10 hours and you still aren't able to pee (urinate).  If you have a history of sleep apnea, it's very important to use your CPAP machine for the next 24 hours when you nap or sleep.   For informational purposes only. Not to replace the advice of your health care provider. Copyright   2023 [employer]. All rights reserved. Clinically reviewed by Eliceo Anderson MD. [employer] [id number] - REV 09/23.     None known

## 2024-01-23 ENCOUNTER — ANESTHESIA (OUTPATIENT)
Dept: SURGERY | Facility: HOSPITAL | Age: 79
End: 2024-01-23
Payer: MEDICARE

## 2024-01-23 ENCOUNTER — HOSPITAL ENCOUNTER (OUTPATIENT)
Facility: HOSPITAL | Age: 79
Discharge: HOME | End: 2024-01-23
Attending: OPHTHALMOLOGY | Admitting: OPHTHALMOLOGY
Payer: MEDICARE

## 2024-01-23 VITALS
DIASTOLIC BLOOD PRESSURE: 78 MMHG | RESPIRATION RATE: 16 BRPM | SYSTOLIC BLOOD PRESSURE: 141 MMHG | TEMPERATURE: 97 F | HEART RATE: 60 BPM | BODY MASS INDEX: 36.68 KG/M2 | HEIGHT: 63 IN | OXYGEN SATURATION: 95 % | WEIGHT: 207 LBS

## 2024-01-23 PROCEDURE — 272N000001 HC OR GENERAL SUPPLY STERILE: Performed by: OPHTHALMOLOGY

## 2024-01-23 PROCEDURE — 250N000011 HC RX IP 250 OP 636: Performed by: OPHTHALMOLOGY

## 2024-01-23 PROCEDURE — 370N000017 HC ANESTHESIA TECHNICAL FEE, PER MIN: Performed by: OPHTHALMOLOGY

## 2024-01-23 PROCEDURE — 360N000076 HC SURGERY LEVEL 3, PER MIN: Performed by: OPHTHALMOLOGY

## 2024-01-23 PROCEDURE — 66984 XCAPSL CTRC RMVL W/O ECP: CPT | Performed by: NURSE ANESTHETIST, CERTIFIED REGISTERED

## 2024-01-23 PROCEDURE — 710N000012 HC RECOVERY PHASE 2, PER MINUTE: Performed by: OPHTHALMOLOGY

## 2024-01-23 PROCEDURE — 250N000011 HC RX IP 250 OP 636: Performed by: NURSE ANESTHETIST, CERTIFIED REGISTERED

## 2024-01-23 PROCEDURE — V2632 POST CHMBR INTRAOCULAR LENS: HCPCS | Performed by: OPHTHALMOLOGY

## 2024-01-23 PROCEDURE — 99100 ANES PT EXTEME AGE<1 YR&>70: CPT | Performed by: NURSE ANESTHETIST, CERTIFIED REGISTERED

## 2024-01-23 PROCEDURE — 999N000141 HC STATISTIC PRE-PROCEDURE NURSING ASSESSMENT: Performed by: OPHTHALMOLOGY

## 2024-01-23 PROCEDURE — 250N000009 HC RX 250: Performed by: OPHTHALMOLOGY

## 2024-01-23 DEVICE — IMPLANTABLE DEVICE: Type: IMPLANTABLE DEVICE | Site: EYE | Status: FUNCTIONAL

## 2024-01-23 RX ORDER — MOXIFLOXACIN 5 MG/ML
SOLUTION/ DROPS OPHTHALMIC PRN
Status: DISCONTINUED | OUTPATIENT
Start: 2024-01-23 | End: 2024-01-23 | Stop reason: HOSPADM

## 2024-01-23 RX ORDER — TETRACAINE HYDROCHLORIDE 5 MG/ML
SOLUTION OPHTHALMIC PRN
Status: DISCONTINUED | OUTPATIENT
Start: 2024-01-23 | End: 2024-01-23 | Stop reason: HOSPADM

## 2024-01-23 RX ORDER — PROPARACAINE HYDROCHLORIDE 5 MG/ML
1 SOLUTION/ DROPS OPHTHALMIC
Status: COMPLETED | OUTPATIENT
Start: 2024-01-23 | End: 2024-01-23

## 2024-01-23 RX ORDER — ONDANSETRON 4 MG/1
4 TABLET, ORALLY DISINTEGRATING ORAL EVERY 30 MIN PRN
Status: DISCONTINUED | OUTPATIENT
Start: 2024-01-23 | End: 2024-01-23 | Stop reason: HOSPADM

## 2024-01-23 RX ORDER — PHENYLEPHRINE HYDROCHLORIDE 100 MG/ML
1 SOLUTION/ DROPS OPHTHALMIC ONCE
Status: COMPLETED | OUTPATIENT
Start: 2024-01-23 | End: 2024-01-23

## 2024-01-23 RX ORDER — LIDOCAINE HYDROCHLORIDE 10 MG/ML
INJECTION, SOLUTION EPIDURAL; INFILTRATION; INTRACAUDAL; PERINEURAL PRN
Status: DISCONTINUED | OUTPATIENT
Start: 2024-01-23 | End: 2024-01-23 | Stop reason: HOSPADM

## 2024-01-23 RX ORDER — PROPARACAINE HYDROCHLORIDE 5 MG/ML
1 SOLUTION/ DROPS OPHTHALMIC ONCE
Status: COMPLETED | OUTPATIENT
Start: 2024-01-23 | End: 2024-01-23

## 2024-01-23 RX ORDER — LIDOCAINE HYDROCHLORIDE 20 MG/ML
JELLY TOPICAL ONCE
Status: COMPLETED | OUTPATIENT
Start: 2024-01-23 | End: 2024-01-23

## 2024-01-23 RX ORDER — PILOCARPINE HYDROCHLORIDE 10 MG/ML
SOLUTION/ DROPS OPHTHALMIC PRN
Status: DISCONTINUED | OUTPATIENT
Start: 2024-01-23 | End: 2024-01-23 | Stop reason: HOSPADM

## 2024-01-23 RX ORDER — PHENYLEPHRINE HYDROCHLORIDE 100 MG/ML
1 SOLUTION/ DROPS OPHTHALMIC
Status: COMPLETED | OUTPATIENT
Start: 2024-01-23 | End: 2024-01-23

## 2024-01-23 RX ORDER — ONDANSETRON 2 MG/ML
4 INJECTION INTRAMUSCULAR; INTRAVENOUS EVERY 30 MIN PRN
Status: DISCONTINUED | OUTPATIENT
Start: 2024-01-23 | End: 2024-01-23 | Stop reason: HOSPADM

## 2024-01-23 RX ORDER — MOXIFLOXACIN 5 MG/ML
1 SOLUTION/ DROPS OPHTHALMIC
Status: COMPLETED | OUTPATIENT
Start: 2024-01-23 | End: 2024-01-23

## 2024-01-23 RX ORDER — CYCLOPENTOLATE HYDROCHLORIDE 20 MG/ML
1 SOLUTION/ DROPS OPHTHALMIC ONCE
Status: COMPLETED | OUTPATIENT
Start: 2024-01-23 | End: 2024-01-23

## 2024-01-23 RX ORDER — LIDOCAINE 40 MG/G
CREAM TOPICAL
Status: DISCONTINUED | OUTPATIENT
Start: 2024-01-23 | End: 2024-01-23 | Stop reason: HOSPADM

## 2024-01-23 RX ORDER — ACETAMINOPHEN 325 MG/1
650 TABLET ORAL
Status: DISCONTINUED | OUTPATIENT
Start: 2024-01-23 | End: 2024-01-23 | Stop reason: HOSPADM

## 2024-01-23 RX ORDER — LEVOBUNOLOL HYDROCHLORIDE 5 MG/ML
SOLUTION/ DROPS OPHTHALMIC PRN
Status: DISCONTINUED | OUTPATIENT
Start: 2024-01-23 | End: 2024-01-23 | Stop reason: HOSPADM

## 2024-01-23 RX ORDER — CYCLOPENTOLAT/TROPIC/PHENYLEPH 1%-1%-2.5%
1 DROPS (EA) OPHTHALMIC (EYE)
Status: COMPLETED | OUTPATIENT
Start: 2024-01-23 | End: 2024-01-23

## 2024-01-23 RX ADMIN — CYCLOPENTOLATE HYDROCHLORIDE 1 DROP: 20 SOLUTION/ DROPS OPHTHALMIC at 10:14

## 2024-01-23 RX ADMIN — Medication 1 DROP: at 10:44

## 2024-01-23 RX ADMIN — PROPARACAINE HYDROCHLORIDE 1 DROP: 5 SOLUTION/ DROPS OPHTHALMIC at 10:12

## 2024-01-23 RX ADMIN — PHENYLEPHRINE HYDROCHLORIDE 1 DROP: 100 SOLUTION/ DROPS OPHTHALMIC at 11:12

## 2024-01-23 RX ADMIN — Medication 1 DROP: at 10:20

## 2024-01-23 RX ADMIN — MOXIFLOXACIN OPHTHALMIC SOLUTION 1 DROP: 5 SOLUTION/ DROPS OPHTHALMIC at 10:36

## 2024-01-23 RX ADMIN — MIDAZOLAM 2 MG: 1 INJECTION INTRAMUSCULAR; INTRAVENOUS at 11:47

## 2024-01-23 RX ADMIN — MOXIFLOXACIN OPHTHALMIC SOLUTION 1 DROP: 5 SOLUTION/ DROPS OPHTHALMIC at 10:32

## 2024-01-23 RX ADMIN — LIDOCAINE HYDROCHLORIDE: 20 JELLY TOPICAL at 10:47

## 2024-01-23 RX ADMIN — MOXIFLOXACIN OPHTHALMIC SOLUTION 1 DROP: 5 SOLUTION/ DROPS OPHTHALMIC at 10:28

## 2024-01-23 RX ADMIN — PROPARACAINE HYDROCHLORIDE 1 DROP: 5 SOLUTION/ DROPS OPHTHALMIC at 10:41

## 2024-01-23 RX ADMIN — PHENYLEPHRINE HYDROCHLORIDE 1 DROP: 100 SOLUTION/ DROPS OPHTHALMIC at 10:21

## 2024-01-23 ASSESSMENT — ACTIVITIES OF DAILY LIVING (ADL): ADLS_ACUITY_SCORE: 35

## 2024-01-23 NOTE — H&P (VIEW-ONLY)
Formatting of this note is different from the original.  Pre-Operative H&P    Reason for Visit:  Blanca Contreras is a 78 year old seen today for a pre-operative evaluation. I am seeing the patient at the request of Dr. Gutierres. Patient is scheduled for cataract surgery right at [facility], [place], [place] on January 9, 2024.     Pre-Op HPI:  She is planning on having her Right eye cataract fixed first then left the end of the month. This will be done in [place] due to Cardiac disease.     Pre-operative Risk Assessment and ROS:  General:  History of significant transfusion antibody reaction: No  Diabetes Mellitus: Yes-controlled by diet and Farxiga  Dyspnea: Yes-with moderate exertion  Functional Health Status: partially dependent  Chronic Pain: No  Open wound with/without infection: No  >10% loss of body weight past 6 months: No  Fever > 101 in past month: No  Steroid use for chronic condition: No  Exposure to Prednisone > 5 mg/day for > 3 weeks in past 6 months: No  Bleeding disorders: No  Actively managed cancer: No  Pregnancy: Not applicable    Functional Capacity Assessment:  Able to walk 200 feet (2 blocks) without stopping due to symptoms? No. Able to perform ADL's, housework, walking indoors? Yes - > 4 METS capacity.    Pulmonary:  History of Asthma. uses Advair daily    Cardiac:  History of Ischemic Heart Disease: angina within past 30 days: no;  MI past 6 months: no; Percutaneous coronary intervention (PCI)/Percutaneous transluminal coronary angioplasty (PTCA): yes-Drug eluting stent (TREASURE): date 4/4/2019; cardiac surgery: yes; dual antiplatelet therapy: no; l    Has pacemaker/defibrillator in place.     Milena has a known history of coronary artery disease, heart failure with reduced ejection fraction, diastolic dysfunction, chronic kidney disease stage III, diabetes mellitus type 2, hypertension    Echocardiogram on 3/09/20 revealed LVEF 30-35%     Saw cardiology in November: Stable.      History of arrhythmia: Atrial Fibrillation: no; pacemaker: yes; defibrillator: yes-last ICD discharge: 11/28/23 (1) month(s) ago    Vascular:  No history of vascular disease.     Hepatobiliary:  No history of hepatobiliary disease.    Gastrointestinal:  No history of significant GI disease.    Renal:  stage 3 chronic kidney disease and history of kidney injury    Central Nervous System:  No history of CNS disease.    Additional ROS (undiagnosed new complaints):  Endocrine: Type 2 diabetes mellitus   Gastrointestinal: history of small bowel obstruction, incisional hernia  Genitourinary: urologic disorder, hypomagnesemia  Musculoskeletal: primary osteoarthritis of both knees, gout, osteopenia, DJD of right AC joint    Problem List:  Patient Active Problem List   Diagnosis   ? Persistent asthma without complication, unspecified asthma severity   ? Primary osteoarthritis of both knees   ? Urologic disorder   ? Essential hypertension   ? Hyperlipidemia   ? Incisional hernia   ? Pulmonary hypertension (HCC)   ? Adiposity   ? Psoriasis   ? Type 2 diabetes mellitus with stage 3 chronic kidney disease, without long-term current use of insulin (HCC)   ? Gout   ? Alopecia   ? Osteopenia   ? DJD of right AC (acromioclavicular) joint   ? Chronic combined systolic (congestive) and diastolic (congestive) heart failure (HCC)   ? Pericardial effusion   ? Prolonged QT interval   ? Hypomagnesemia   ? Coronary artery disease involving native coronary artery of native heart without angina pectoris   ? Ischemic cardiomyopathy   ? Left bundle branch block   ? ICD (implantable cardioverter-defibrillator) in place   ? S/P pericardial window for drainage of effusion   ? History of kidney injury   ? History of small bowel obstruction   ? Celiac artery aneurysm (HCC)     Past Medical History:  See pre-operative risk assessment above  Past Medical History:   Diagnosis Date   ? Alopecia 03/03/2014   ? Arthritis    ? Asthma, moderate  persistent 03/03/2014   ? Gout 03/03/2014   ? Hyperlipidemia 03/03/2014   ? Hyperuricemia 03/03/2014   ? Incisional hernia without mention of obstruction or gangrene 03/03/2014   ? Obesity 03/03/2014   ? Osteopenia    ? Psoriasis 03/03/2014   ? Renal insufficiency 03/03/2014   ? Tendinitis of shoulder, right 03/03/2014   ? Type II or unspecified type diabetes mellitus without mention of complication, uncontrolled 03/03/2014   ? Unspecified essential hypertension 03/03/2014     Past Surgical/Anesthesia History:  Past Surgical History:   Procedure Laterality Date   ? CARDIAC CATHETERIZATION N/A 4/4/2019    Procedure: Left Heart Cath W/Wo Ventriculography;  Surgeon: Tahmina Pimentel MD;  Location: [facility] CATH LAB   ? CARDIAC CATHETERIZATION N/A 4/4/2019    Procedure: Right Heart Cath;  Surgeon: Tahmina Pimentel MD;  Location: [facility] CATH LAB   ? CARDIAC CATHETERIZATION N/A 4/4/2019    Procedure: PERICARDIOCENTESIS;  Surgeon: Tahmina Pimentel MD;  Location: [facility] CATH LAB   ? CARDIAC CATHETERIZATION N/A 4/4/2019    Procedure: Cv Pci - Coronary;  Surgeon: Tahmina Pimentel MD;  Location: [facility] CATH LAB   ? CARDIAC CATHETERIZATION N/A 4/4/2019    Procedure: Cv Coronary Ivus;  Surgeon: Tahmina Pimentel MD;  Location: [facility] CATH LAB   ? CARDIAC CATHETERIZATION Right 4/9/2019    Procedure: Right Heart Cath;  Surgeon: Chapis Quintana MD;  Location: [facility] CATH LAB   ? CARDIAC CATHETERIZATION Right 11/20/2019    Procedure: Pericardiocentesis Right Heart Catheterization;  Surgeon: Yohan Jones MD;  Location: [facility] CATH LAB   ? COLONOSCOPY  04/03/2014    [facility]/[place]: Dr. Chantell De Paz   ? DEXA SCAN  03/25/2013    Osteopenia ([place])   ? ELECTROPHYSIOLOGY PROCEDURE Left 11/20/2019    Procedure: ICD INSERT BIVENTRICULAR;  Surgeon: Rush Martinez MBBS;  Location: [facility] EP LAB   ? ELECTROPHYSIOLOGY PROCEDURE N/A 2/11/2020    Procedure: ICD INSERT BIVENTRICULAR;  Surgeon:  Rush Martinez MBBS;  Location: [facility] EP LAB   ? FINGER FRACTURE SURGERY Left    ? PERICARDIUM SURGERY N/A 2/7/2020    Procedure: Subxiphoid Pericardial Window with  drainage of pericardial effusion;  Surgeon: Harpal Metz MD;  Location: [facility] MAIN ORS   ? JIMMY AND BSO       The patient has not had problems during fritz-operative period    Current Medications:  Current Outpatient Medications   Medication Sig   ? rosuvastatin (Crestor) 40 MG tablet Take 1 Tablet by mouth one time a day.   ? ezetimibe (Zetia) 10 MG tablet Take 1 Tablet by mouth one time a day.   ? carvedilol (Coreg) 25 MG tablet Take 2 Tablets by mouth two times a day with meals.   ? Sacubitril-Valsartan (Entresto)  MG Tablet tablet Take 1 Tab by mouth two times a day.   ? Emollient (CeraVe Moisturizing) Cream Apply 1 Dose topically one time a day. Apply to affected area:bilateral legs   ? dapagliflozin (Farxiga) 10 MG Tablet Take 1 Tablet by mouth one time a day. Do not chew or crush.   ? amLODIPine (Norvasc) 5 MG tablet Take 1 Tablet by mouth one time a day.   ? fluticasone-salmeterol (Advair Diskus) 250-50 MCG/ACT aerosol powder, breath activated Inhale 1 Puff into the lungs two times a day.   ? albuterol HFA (Ventolin HFA) 108 (90 Base) MCG/ACT inhalation aerosol Inhale 2 Puffs into the lungs every four hours as needed for Wheezing or Shortness of Breath.   ? acetaminophen (Tylenol) 500 MG tablet Take 1,000 mg by mouth every six hours as needed. Limit acetaminophen to 4000 mg per day from all sources.   ? furosemide (Lasix) 20 MG tablet Take 1 Tablet by mouth one time a day as needed for Other (wt gain 3 lbs/24 hrs +/- 5 lbs/1 week.  worsening edema.).   ? aspirin 81 MG chewable tablet Take 81 mg by mouth.     No current facility-administered medications for this visit.     Cannot display prior to admission medications because the patient has not been admitted in this contact.     Allergies and Drug Sensitivities:   Allergies    Allergen Reactions   ? Cats RASH     (fur, dander, saliva)  And BEER  And BEER   ? Food Unknown     BEER HOPPS: RED BLOTCHY SKIN     Immunizations:  Immunization History   Administered Date(s) Administered   ? COVID-19 MRNA VACCINE (PFIZER TRI-SUCR)-Grey-12+ YRS 06/10/2022   ? COVID-19 MRNA Vaccine (Pfizer Bivalent TRI-SUCR) Marx 12+ YRS 10/04/2022   ? COVID-19 MRNA Vaccine (Pfizer JONE-SUCR) Grey 12+ Yrs (2023) 11/02/2023   ? COVID-19 Vaccine: Pfizer Dose 3 (Purple- 12+ Yrs) [facility] 10/08/2021   ? COVID-19 mRNA Vaccine (Pfizer-Purple 12+ Yrs) 01/26/2021, 02/16/2021   ? Influenza (3+ Yrs) NPF-Multi Dose Vial ([facility]) 11/29/2010   ? Influenza (4+ Yrs) Pf-single Dose ([facility]) 12/10/2012   ? Influenza A H1n1 11/23/2009   ? Influenza High Dose Quadrivalent 10/19/2018, 10/26/2020, 09/17/2021, 10/04/2022   ? Influenza Seasonal Inj A,B 12/10/2012   ? Influenza Seasonal Inj A,B High Dose 10/27/2017, 10/19/2018, 11/08/2019   ? Influenza Vaccine, Quadrivalent, Adjuvanted (Fluad) 65+ Years 11/02/2023   ? Influenza, Whole 11/29/2010   ? Pneumococcal Conjugate, (Prevnar)13-valent 05/05/2016   ? Pneumovax 23 03/03/2014   ? TD >7yrs With Preservative 02/27/2001   ? Tdap (7 years and older) 03/20/2013     Is the patient up to date for Influenza, Shingrix, Pneumococcal, and Tdap vaccines as applicable for age-no    Patient or any member of family has history of MRSA or skin infections: No    Family History:  Negative for bleeding disorder/clotting disorder or anesthesia problems    Social History:  Review reveals: /, lives at home, vision deficit and ambulates with walker    Code Status: Full Code/Resuscitation  Lift DNR for surgery: Not Applicable  Advance Directive: Not on File    Physical Examination: (APPEARANCE, SKIN, HEAD, NOSE, THROAT, NECK, LUNG, HEART and MENTAL STATUS are critical for preop assessment)    Vitals:    12/29/23 1256   BP: 122/72   Pulse: 60   Temp: 36.4  C (97.6  F)   TempSrc:  "Tympanic   Resp: 18   Height: 1.626 m (5' 4.02\")   Weight: 92 kg (202 lb 12.8 oz)   SpO2: 96%   BMI (Calculated): 34.79    Body mass index is 34.79 kg/m .  Room Air  APPEARANCE: alert, no apparent distress.  SKIN: clear with no lesions or rash noted.  HEAD: normal.  EYES: eyelid(s) normal, conjunctiva clear, EOM(s) intact, PERRL.  EARS: both external canals normal and TM's intact, flat, translucent with normal landmarks.  NOSE: septum midline and normal mucosa.  MOUTH/THROAT: lips, tongue, oral mucosa and pharynx normal.  NECK: supple, no adenopathy, no masses or thyromegaly.  LUNG: good respiratory effort without retractions, good air entry and normal breath sounds bilaterally.  HEART/PULSES: regular, S1 & S2 normal.  ABDOMEN: flat, soft without tenderness.  BACK/SPINE: symmetric with normal posture and no abnormalities noted.  EXTREMITIES: extremities normal.  NEUROLOGIC: alert, orientated X 3, interaction normal, and exam normal with no focal findings.    LABS:   Lab Results   Component Value Date     11/13/2023    K 4.5 11/13/2023    CREAT 1.89 11/13/2023    CRWB 1.60 10/04/2022    WBC 6.7 11/02/2023    HGB 13.6 11/02/2023    PLTS 157 11/02/2023    INR 1.0 11/20/2019    GLUC 126 11/13/2023    GLUC 125 11/02/2023    GLUC 153 11/02/2022    GLUWB 134 10/04/2022    HGA1C 7.1 11/02/2023    HGA1C 6.3 11/02/2022    HGA1C 5.9 01/29/2020     AV dual-paced rhythm  Abnormal ECG  When compared with ECG of 04-Oct-2022 15:25,  Electronic ventricular pacemaker has replaced Sinus rhythm     Component Value Ref Range & Units Status   Ventricular Rate 60  BPM Final   Atrial Rate 60  BPM Final   P-R Interval 116  ms Final   QRS Duration 124  ms Final     ms Final   QTc 476  ms Final   R Axis -63  degrees Final   T Axis 125  degrees Final     CXR: not indicated  U/A: not indicated    Assessment/ Plan:  Preoperative Exam  Hypertension: controlled  Diabetes Mellitus: controlled  Congestive Heart Failure: " controlled  Atrial Fibrillation: controlled  Asthma: Controlled    Would patient benefit from cardiac workup (< 4 METS capacity/surgical risk intermediate or high)? no  Would patient benefit from Pre-op Beta blocker? no  Does the patient need clear diabetes related orders? no  Any reason to refuse blood transfusion? no  Further workup for surgery needed? no  Is this patient on buprenorphine? No -  Patient is optimized for surgery? YES    This preoperative note has been sent to the requesting physician for review: yes    Electronically signed by Stephenie Clay APRN, CNP at 12/29/2023  4:00 PM CST

## 2024-01-23 NOTE — ANESTHESIA CARE TRANSFER NOTE
Patient: Milena Contreras    Procedure: Procedure(s):  Phacoemulsification Cataract Extraction Posterior Chamber Lens Left Eye       Diagnosis: Combined form of age-related cataract, left eye [H25.812]  Diagnosis Additional Information: No value filed.    Anesthesia Type:   MAC     Note:    Oropharynx: spontaneously breathing  Level of Consciousness: awake  Oxygen Supplementation: room air      Dentition: dentition unchanged  Vital Signs Stable: post-procedure vital signs reviewed and stable    Patient transferred to: Phase II    Handoff Report: Identifed the Patient, Identified the Reponsible Provider, Reviewed the pertinent medical history, Discussed the surgical course, Reviewed Intra-OP anesthesia mangement and issues during anesthesia, Set expectations for post-procedure period and Allowed opportunity for questions and acknowledgement of understanding      Vitals:  Vitals Value Taken Time   BP     Temp     Pulse     Resp     SpO2         Electronically Signed By: RUBY Gonzalez CRNA  January 23, 2024  12:15 PM

## 2024-01-23 NOTE — OR NURSING
Patient and responsible adult given discharge instructions with no questions regarding instructions. Bull score 20/20. Pain level 2/10.  Discharged from unit via wheelchair. Patient discharged to home with gricelda Saxena.

## 2024-01-23 NOTE — OP NOTE
[facility]  Ophthalmology Full Operative Note    Pre-operative diagnosis: Combined form of age-related cataract, left eye [H25.812]   Post-operative diagnosis Same   Procedure: Procedure(s):  Phacoemulsification Cataract Extraction Posterior Chamber Lens Left Eye   Surgeon: Guy Gutierres MD   Assistants(s):    Anesthesia: MAC with Local    Estimated blood loss: None    Total IV fluids: (See anesthesia record)   Specimens: None   Implants: 15.5 LI61AO   Findings:    Complications: None   Condition: Stable   Comments:       PROCEDURAL ANESTHESIA:     Topical/MAC.  Lidocaine 2% jelly topically and Lidocaine 1% preservative-free intracamerally.     PROCEDURE:  The patient was brought to the Operating Room and prepped and draped in a sterile manner.  A wire lid speculum was placed.  A paracentesis was created and 1% Lidocaine was instilled in the anterior chamber.  The anterior chamber was then filled with Amvisc Plus viscoelastic.  A clear cornea temporal wound was created using a 2.8 mm keratome.  A cystotome was used to initiate a flap in the anterior capsule and a Utrata forceps was used to create a continuous tear capsulorhexis.  Hydrodissection was performed.  The phacoemulsification tip was inserted into the eye and the nucleus and epinucleus were removed using a divide and conquer technique.  The residual cortex was removed using the I/A handpiece.  The anterior chamber was then refilled with viscoelastic and the wound was enlarged with the keratome.  The intraocular lens, 15.5 diopter, Model LI61AO, was placed into the injector and injected into the capsular bag. It was checked to make sure that it was central and stable.  Residual viscoelastic was removed using the I/A.  The anterior chamber was refilled with BSS.  The wounds were hydrated with BSS and were noted to be watertight with no suture necessary.  Topical pilocarpine 1%, Betagan, and Vigamox was applied.  A hard shield was placed.      The patient tolerated the procedure well and was sent to the Recovery Room in satisfactory condition.

## (undated) DEVICE — PACK PHACO STELLARIS VAC 1 AUX DRP 1 NDL WRNCH BL5110

## (undated) DEVICE — EYE PREP BETADINE 5% SOLUTION 30ML 0065-0411-30

## (undated) DEVICE — EYE CANN IRR 25GA CYSTOTOME 581610

## (undated) DEVICE — INSTRUMENT WIPE VISIWIPE 581047

## (undated) DEVICE — BIN-TECNIS DCB00 LENSES

## (undated) DEVICE — CANNULA IRR 7/8IN 30GA VSTC VSCFL 45D 9MM DISP 585046

## (undated) DEVICE — EYE KNIFE MICRO 15DEG BEVEL 377516

## (undated) DEVICE — PACK EYE CUSTOM SEY32EPMBO

## (undated) DEVICE — INJECTOR PORT FOR IOL LENSES SOFPORT EZ-28

## (undated) DEVICE — GLOVE PROTEXIS POWDER FREE 7.0 ORTHOPEDIC 2D73ET70

## (undated) DEVICE — SET HANDPIECE IRRIG/ASPIRATION 2.2-2.8X5.4" 45DEG TIP 85910S

## (undated) DEVICE — GLOVE PROTEXIS POWDER FREE CLSC 7.5  2D72PL75X

## (undated) DEVICE — EYE KNIFE SLIT XSTAR VISITEC 2.8MM 45DEG 373728

## (undated) DEVICE — BIN-LENS IMPLANT CART

## (undated) DEVICE — BIN-CATARACT BIN BN37

## (undated) DEVICE — CANNULA IRR 7/8IN 25GA VSTC VSCFL 45D 9MM DISP 585045

## (undated) DEVICE — EYE CANN IRR 25GA HYDRODISSECTING 585037